# Patient Record
Sex: FEMALE | Race: OTHER | Employment: UNEMPLOYED | URBAN - METROPOLITAN AREA
[De-identification: names, ages, dates, MRNs, and addresses within clinical notes are randomized per-mention and may not be internally consistent; named-entity substitution may affect disease eponyms.]

---

## 2017-02-02 ENCOUNTER — HOSPITAL ENCOUNTER (EMERGENCY)
Facility: HOSPITAL | Age: 52
Discharge: HOME | End: 2017-02-03
Attending: EMERGENCY MEDICINE
Payer: MEDICAID

## 2017-02-02 DIAGNOSIS — B34.9 VIRAL SYNDROME: Primary | ICD-10-CM

## 2017-02-02 PROCEDURE — 96374 THER/PROPH/DIAG INJ IV PUSH: CPT

## 2017-02-02 PROCEDURE — 99284 EMERGENCY DEPT VISIT MOD MDM: CPT

## 2017-02-02 PROCEDURE — 96361 HYDRATE IV INFUSION ADD-ON: CPT

## 2017-02-03 ENCOUNTER — APPOINTMENT (OUTPATIENT)
Dept: GENERAL RADIOLOGY | Facility: HOSPITAL | Age: 52
End: 2017-02-03
Attending: EMERGENCY MEDICINE
Payer: MEDICAID

## 2017-02-03 VITALS
WEIGHT: 143 LBS | RESPIRATION RATE: 18 BRPM | TEMPERATURE: 98 F | HEIGHT: 65 IN | OXYGEN SATURATION: 99 % | DIASTOLIC BLOOD PRESSURE: 60 MMHG | SYSTOLIC BLOOD PRESSURE: 114 MMHG | HEART RATE: 65 BPM | BODY MASS INDEX: 23.82 KG/M2

## 2017-02-03 LAB
ALBUMIN SERPL-MCNC: 3.9 G/DL (ref 3.5–4.8)
ALP LIVER SERPL-CCNC: 78 U/L (ref 41–108)
ALT SERPL-CCNC: 25 U/L (ref 14–54)
AST SERPL-CCNC: 15 U/L (ref 15–41)
BASOPHILS # BLD AUTO: 0.05 X10(3) UL (ref 0–0.1)
BASOPHILS NFR BLD AUTO: 0.4 %
BILIRUB SERPL-MCNC: 0.2 MG/DL (ref 0.1–2)
BILIRUB UR QL STRIP.AUTO: NEGATIVE
BUN BLD-MCNC: 23 MG/DL (ref 8–20)
CALCIUM BLD-MCNC: 9.1 MG/DL (ref 8.3–10.3)
CHLORIDE: 106 MMOL/L (ref 101–111)
CLARITY UR REFRACT.AUTO: CLEAR
CO2: 25 MMOL/L (ref 22–32)
COLOR UR AUTO: YELLOW
CREAT BLD-MCNC: 0.98 MG/DL (ref 0.55–1.02)
EOSINOPHIL # BLD AUTO: 0.08 X10(3) UL (ref 0–0.3)
EOSINOPHIL NFR BLD AUTO: 0.7 %
ERYTHROCYTE [DISTWIDTH] IN BLOOD BY AUTOMATED COUNT: 14 % (ref 11.5–16)
GLUCOSE BLD-MCNC: 97 MG/DL (ref 70–99)
GLUCOSE UR STRIP.AUTO-MCNC: NEGATIVE MG/DL
HCT VFR BLD AUTO: 41.4 % (ref 34–50)
HGB BLD-MCNC: 13.2 G/DL (ref 12–16)
HYALINE CASTS #/AREA URNS AUTO: PRESENT /LPF
IMMATURE GRANULOCYTE COUNT: 0.06 X10(3) UL (ref 0–1)
IMMATURE GRANULOCYTE RATIO %: 0.5 %
KETONES UR STRIP.AUTO-MCNC: NEGATIVE MG/DL
LYMPHOCYTES # BLD AUTO: 4.24 X10(3) UL (ref 0.9–4)
LYMPHOCYTES NFR BLD AUTO: 36.9 %
M PROTEIN MFR SERPL ELPH: 8.3 G/DL (ref 6.1–8.3)
MCH RBC QN AUTO: 25.5 PG (ref 27–33.2)
MCHC RBC AUTO-ENTMCNC: 31.9 G/DL (ref 31–37)
MCV RBC AUTO: 79.9 FL (ref 81–100)
MONOCYTES # BLD AUTO: 0.54 X10(3) UL (ref 0.1–0.6)
MONOCYTES NFR BLD AUTO: 4.7 %
NEUTROPHIL ABS PRELIM: 6.52 X10 (3) UL (ref 1.3–6.7)
NEUTROPHILS # BLD AUTO: 6.52 X10(3) UL (ref 1.3–6.7)
NEUTROPHILS NFR BLD AUTO: 56.8 %
NITRITE UR QL STRIP.AUTO: NEGATIVE
PH UR STRIP.AUTO: 5 [PH] (ref 4.5–8)
PLATELET # BLD AUTO: 342 10(3)UL (ref 150–450)
POTASSIUM SERPL-SCNC: 3.6 MMOL/L (ref 3.6–5.1)
PROT UR STRIP.AUTO-MCNC: NEGATIVE MG/DL
RBC # BLD AUTO: 5.18 X10(6)UL (ref 3.8–5.1)
RBC UR QL AUTO: NEGATIVE
RED CELL DISTRIBUTION WIDTH-SD: 40.6 FL (ref 35.1–46.3)
SODIUM SERPL-SCNC: 141 MMOL/L (ref 136–144)
SP GR UR STRIP.AUTO: 1.02 (ref 1–1.03)
UROBILINOGEN UR STRIP.AUTO-MCNC: <2 MG/DL
WBC # BLD AUTO: 11.5 X10(3) UL (ref 4–13)

## 2017-02-03 PROCEDURE — 85025 COMPLETE CBC W/AUTO DIFF WBC: CPT | Performed by: EMERGENCY MEDICINE

## 2017-02-03 PROCEDURE — 80053 COMPREHEN METABOLIC PANEL: CPT | Performed by: EMERGENCY MEDICINE

## 2017-02-03 PROCEDURE — 71020 XR CHEST PA + LAT CHEST (CPT=71020): CPT

## 2017-02-03 PROCEDURE — 81001 URINALYSIS AUTO W/SCOPE: CPT | Performed by: EMERGENCY MEDICINE

## 2017-02-03 PROCEDURE — 87086 URINE CULTURE/COLONY COUNT: CPT | Performed by: EMERGENCY MEDICINE

## 2017-02-03 RX ORDER — CETIRIZINE HYDROCHLORIDE 10 MG/1
10 TABLET ORAL DAILY
Qty: 30 TABLET | Refills: 0 | Status: SHIPPED | OUTPATIENT
Start: 2017-02-03 | End: 2017-03-05

## 2017-02-03 RX ORDER — PROMETHAZINE HYDROCHLORIDE AND CODEINE PHOSPHATE 6.25; 1 MG/5ML; MG/5ML
5 SYRUP ORAL EVERY 4 HOURS PRN
Qty: 120 ML | Refills: 0 | Status: SHIPPED | OUTPATIENT
Start: 2017-02-03 | End: 2017-03-05

## 2017-02-03 RX ORDER — KETOROLAC TROMETHAMINE 30 MG/ML
30 INJECTION, SOLUTION INTRAMUSCULAR; INTRAVENOUS ONCE
Status: COMPLETED | OUTPATIENT
Start: 2017-02-03 | End: 2017-02-03

## 2017-02-03 NOTE — ED PROVIDER NOTES
Patient Seen in: [facility] Emergency Department    History   Patient presents with:  Cough/URI  Fatigue (constitutional, neurologic)    Stated Complaint: cough, headache, fatigue    HPI    Patient is a 44-year-old female smoker coming in for cough person, place, and time. She appears well-developed and well-nourished. No distress. HENT:   Head: Normocephalic and atraumatic. Mouth/Throat: Oropharynx is clear and moist. No oropharyngeal exudate.    Eyes: Conjunctivae and EOM are normal. Pupils are -----------         ------                     CBC W/ DIFFERENTIAL[[id number]]          Abnormal            Final result                 Please view results for these tests on the individual orders.    URINE CULTURE, ROUTINE   FRONTAL AND

## 2017-04-20 ENCOUNTER — APPOINTMENT (OUTPATIENT)
Dept: GENERAL RADIOLOGY | Facility: HOSPITAL | Age: 52
End: 2017-04-20
Payer: MEDICAID

## 2017-04-20 ENCOUNTER — HOSPITAL ENCOUNTER (EMERGENCY)
Facility: HOSPITAL | Age: 52
Discharge: HOME | End: 2017-04-20
Attending: EMERGENCY MEDICINE
Payer: MEDICAID

## 2017-04-20 VITALS
OXYGEN SATURATION: 98 % | HEART RATE: 79 BPM | BODY MASS INDEX: 24.99 KG/M2 | TEMPERATURE: 98 F | DIASTOLIC BLOOD PRESSURE: 53 MMHG | WEIGHT: 150 LBS | SYSTOLIC BLOOD PRESSURE: 118 MMHG | HEIGHT: 65 IN | RESPIRATION RATE: 16 BRPM

## 2017-04-20 DIAGNOSIS — M70.42 PREPATELLAR BURSITIS OF LEFT KNEE: Primary | ICD-10-CM

## 2017-04-20 DIAGNOSIS — M54.9 MID BACK PAIN: ICD-10-CM

## 2017-04-20 PROCEDURE — 99283 EMERGENCY DEPT VISIT LOW MDM: CPT

## 2017-04-20 PROCEDURE — 73562 X-RAY EXAM OF KNEE 3: CPT

## 2017-04-20 RX ORDER — IBUPROFEN 600 MG/1
600 TABLET ORAL EVERY 8 HOURS PRN
Qty: 30 TABLET | Refills: 0 | Status: SHIPPED | OUTPATIENT
Start: 2017-04-20 | End: 2017-04-30

## 2017-04-20 RX ORDER — METHYLPREDNISOLONE 4 MG/1
TABLET ORAL
Qty: 1 PACKAGE | Refills: 0 | Status: SHIPPED | OUTPATIENT
Start: 2017-04-20 | End: 2017-04-25

## 2017-04-21 NOTE — ED PROVIDER NOTES
Patient Seen in: [facility] Emergency Department    History   Patient presents with:  Lower Extremity Injury (musculoskeletal)  Back Pain (musculoskeletal)    Stated Complaint: knee/back pain    HPI    40-year-old female presents emergency departmen is alert and in no acute distress  HEENT: Pupils equal react to light extraocular muscles intact no scleral icterus, mucous membranes are moist, there is no erythema or exudate in the posterior pharynx  Neck: Supple no JVD no lymphadenopathy no meningismus with plan. Patient discharged in good condition.         Disposition and Plan     Clinical Impression:  Prepatellar bursitis of left knee  (primary encounter diagnosis)  Mid back pain    Disposition:  Discharge    Follow-up:  Richa Mari MD  [phone]

## 2017-04-21 NOTE — ED INITIAL ASSESSMENT (HPI)
Pt complains of pain in the left knee for the past 3 days. Pt states the pain started while walking up the stairs. Pt states the stairs and bending are the most amount of pain and will even hurt when straightening after bending.   Pt states the pain is in

## 2017-06-06 ENCOUNTER — HOSPITAL ENCOUNTER (OUTPATIENT)
Dept: GENERAL RADIOLOGY | Facility: HOSPITAL | Age: 52
Discharge: HOME | End: 2017-06-06
Attending: NURSE PRACTITIONER
Payer: MEDICAID

## 2017-06-06 ENCOUNTER — HOSPITAL ENCOUNTER (OUTPATIENT)
Dept: MRI IMAGING | Facility: HOSPITAL | Age: 52
Discharge: HOME | End: 2017-06-06
Payer: MEDICAID

## 2017-06-06 DIAGNOSIS — G89.29 CHRONIC PAIN OF LEFT KNEE: ICD-10-CM

## 2017-06-06 DIAGNOSIS — M54.30 SCIATIC PAIN: ICD-10-CM

## 2017-06-06 DIAGNOSIS — M25.562 CHRONIC PAIN OF LEFT KNEE: ICD-10-CM

## 2017-06-06 PROCEDURE — 72110 X-RAY EXAM L-2 SPINE 4/>VWS: CPT | Performed by: NURSE PRACTITIONER

## 2017-06-06 PROCEDURE — 73721 MRI JNT OF LWR EXTRE W/O DYE: CPT

## 2017-06-06 PROCEDURE — 73721 MRI JNT OF LWR EXTRE W/O DYE: CPT | Performed by: NURSE PRACTITIONER

## 2017-06-13 PROBLEM — S39.012A LUMBAR STRAIN, INITIAL ENCOUNTER: Status: ACTIVE | Noted: 2017-06-13

## 2017-06-13 PROBLEM — M79.605 PAIN OF LEFT LOWER EXTREMITY: Status: ACTIVE | Noted: 2017-06-13

## 2017-06-13 PROBLEM — M23.92 INTERNAL DERANGEMENT OF KNEE, LEFT: Status: ACTIVE | Noted: 2017-06-13

## 2017-07-08 ENCOUNTER — LAB ENCOUNTER (OUTPATIENT)
Dept: LAB | Facility: HOSPITAL | Age: 52
End: 2017-07-08
Attending: INTERNAL MEDICINE
Payer: MEDICAID

## 2017-07-08 ENCOUNTER — OFFICE VISIT (OUTPATIENT)
Dept: RHEUMATOLOGY | Facility: CLINIC | Age: 52
End: 2017-07-08

## 2017-07-08 VITALS
HEIGHT: 65 IN | SYSTOLIC BLOOD PRESSURE: 112 MMHG | DIASTOLIC BLOOD PRESSURE: 73 MMHG | HEART RATE: 84 BPM | BODY MASS INDEX: 24.69 KG/M2 | TEMPERATURE: 98 F | WEIGHT: 148.19 LBS

## 2017-07-08 DIAGNOSIS — L93.0 DISCOID LUPUS: Primary | ICD-10-CM

## 2017-07-08 DIAGNOSIS — M79.7 FIBROMYALGIA: ICD-10-CM

## 2017-07-08 DIAGNOSIS — R53.83 FATIGUE, UNSPECIFIED TYPE: ICD-10-CM

## 2017-07-08 DIAGNOSIS — L93.0 DISCOID LUPUS: ICD-10-CM

## 2017-07-08 LAB
ALBUMIN SERPL BCP-MCNC: 4.3 G/DL (ref 3.5–4.8)
ALBUMIN/GLOB SERPL: 1.1 {RATIO} (ref 1–2)
ALP SERPL-CCNC: 94 U/L (ref 32–100)
ALT SERPL-CCNC: 33 U/L (ref 14–54)
ANION GAP SERPL CALC-SCNC: 10 MMOL/L (ref 0–18)
AST SERPL-CCNC: 26 U/L (ref 15–41)
BILIRUB SERPL-MCNC: 0.4 MG/DL (ref 0.3–1.2)
BUN SERPL-MCNC: 9 MG/DL (ref 8–20)
BUN/CREAT SERPL: 17 (ref 10–20)
C3 SERPL-MCNC: 174 MG/DL (ref 88–201)
C4 SERPL-MCNC: 43 MG/DL (ref 18–55)
CALCIUM SERPL-MCNC: 9.8 MG/DL (ref 8.5–10.5)
CHLORIDE SERPL-SCNC: 101 MMOL/L (ref 95–110)
CK SERPL-CCNC: 31 U/L (ref 38–234)
CO2 SERPL-SCNC: 27 MMOL/L (ref 22–32)
CREAT SERPL-MCNC: 0.53 MG/DL (ref 0.5–1.5)
CRP SERPL-MCNC: <0.5 MG/DL (ref 0–0.9)
ERYTHROCYTE [DISTWIDTH] IN BLOOD BY AUTOMATED COUNT: 14.6 % (ref 11–15)
ERYTHROCYTE [SEDIMENTATION RATE] IN BLOOD: 9 MM/HR (ref 0–30)
GLOBULIN PLAS-MCNC: 3.8 G/DL (ref 2.5–3.7)
GLUCOSE SERPL-MCNC: 99 MG/DL (ref 70–99)
HCT VFR BLD AUTO: 43.4 % (ref 35–48)
HGB BLD-MCNC: 14.3 G/DL (ref 12–16)
MCH RBC QN AUTO: 26.1 PG (ref 27–32)
MCHC RBC AUTO-ENTMCNC: 32.9 G/DL (ref 32–37)
MCV RBC AUTO: 79.4 FL (ref 80–100)
OSMOLALITY UR CALC.SUM OF ELEC: 285 MOSM/KG (ref 275–295)
PLATELET # BLD AUTO: 326 K/UL (ref 140–400)
PMV BLD AUTO: 8.1 FL (ref 7.4–10.3)
POTASSIUM SERPL-SCNC: 4.3 MMOL/L (ref 3.3–5.1)
PROT SERPL-MCNC: 8.1 G/DL (ref 5.9–8.4)
RBC # BLD AUTO: 5.46 M/UL (ref 3.7–5.4)
RHEUMATOID FACT SER QL: <5 IU/ML
SODIUM SERPL-SCNC: 138 MMOL/L (ref 136–144)
TSH SERPL-ACNC: 3.88 UIU/ML (ref 0.45–5.33)
WBC # BLD AUTO: 7.4 K/UL (ref 4–11)

## 2017-07-08 PROCEDURE — 85652 RBC SED RATE AUTOMATED: CPT

## 2017-07-08 PROCEDURE — 86225 DNA ANTIBODY NATIVE: CPT

## 2017-07-08 PROCEDURE — 80053 COMPREHEN METABOLIC PANEL: CPT

## 2017-07-08 PROCEDURE — 36415 COLL VENOUS BLD VENIPUNCTURE: CPT

## 2017-07-08 PROCEDURE — 86235 NUCLEAR ANTIGEN ANTIBODY: CPT

## 2017-07-08 PROCEDURE — 86039 ANTINUCLEAR ANTIBODIES (ANA): CPT

## 2017-07-08 PROCEDURE — 99212 OFFICE O/P EST SF 10 MIN: CPT | Performed by: INTERNAL MEDICINE

## 2017-07-08 PROCEDURE — 85027 COMPLETE CBC AUTOMATED: CPT

## 2017-07-08 PROCEDURE — 84165 PROTEIN E-PHORESIS SERUM: CPT

## 2017-07-08 PROCEDURE — 86038 ANTINUCLEAR ANTIBODIES: CPT

## 2017-07-08 PROCEDURE — 86431 RHEUMATOID FACTOR QUANT: CPT

## 2017-07-08 PROCEDURE — 86160 COMPLEMENT ANTIGEN: CPT

## 2017-07-08 PROCEDURE — 86140 C-REACTIVE PROTEIN: CPT

## 2017-07-08 PROCEDURE — 84443 ASSAY THYROID STIM HORMONE: CPT

## 2017-07-08 PROCEDURE — 82550 ASSAY OF CK (CPK): CPT

## 2017-07-08 PROCEDURE — 99244 OFF/OP CNSLTJ NEW/EST MOD 40: CPT | Performed by: INTERNAL MEDICINE

## 2017-07-08 RX ORDER — HYDROXYCHLOROQUINE SULFATE 200 MG/1
TABLET, FILM COATED ORAL
Qty: 180 TABLET | Refills: 3 | Status: SHIPPED | OUTPATIENT
Start: 2017-07-08 | End: 2018-05-11

## 2017-07-08 NOTE — PROGRESS NOTES
Dear Rocael Narayanan NP:    I saw your patient Malvin Tejeda in consultation this morning at your request for evaluation of lupus. As you know, she is a 49-year-old woman who developed a skin lesion on her left cheek in 1990.   It was biopsied and consistent twice a day which decreases some of her pain. No known drug allergies. Family history:  Her sister  from lupus which was severe in her kidneys. An aunt had \"bone lupus\". Social history:  She is single, and lives with a friend.   She is not wor Neck moves well. Shoulders move well. Elbows, wrists, and hands are unremarkable without synovitis. Capillary refill is good in her fingertips. Lumbar spine flexion is mildly limited. Hips move okay with some discomfort.   Small effusion of her left kn

## 2017-07-11 LAB
ALBUMIN SERPL ELPH-MCNC: 4.95 G/DL (ref 3.8–5.8)
ALBUMIN/GLOB SERPL: 1.53 {RATIO} (ref 1–2)
ALPHA1 GLOB SERPL ELPH-MCNC: 0.19 G/DL (ref 0.1–0.3)
ALPHA2 GLOB SERPL ELPH-MCNC: 0.85 G/DL (ref 0.6–1)
B-GLOBULIN SERPL ELPH-MCNC: 1.13 G/DL (ref 0.7–1.3)
ENA SM IGG SER QL: NEGATIVE
ENA SM+RNP AB SER QL: NEGATIVE
ENA SS-A AB SER QL IA: NEGATIVE
ENA SS-B AB SER QL IA: NEGATIVE
GAMMA GLOB SERPL ELPH-MCNC: 1.07 G/DL (ref 0.5–1.7)
TOTAL PROTEIN (SPECIAL TESTING): 8.2 G/DL (ref 6.5–9.1)

## 2017-07-12 LAB — DSDNA AB TITR SER: <10 {TITER}

## 2017-07-13 LAB — ANA NUCLEOLAR TITR SER IF: 160 {TITER}

## 2017-07-20 ENCOUNTER — APPOINTMENT (OUTPATIENT)
Dept: PHYSICAL THERAPY | Facility: HOSPITAL | Age: 52
End: 2017-07-20
Attending: PHYSICAL MEDICINE & REHABILITATION
Payer: MEDICAID

## 2017-07-24 ENCOUNTER — OFFICE VISIT (OUTPATIENT)
Dept: RHEUMATOLOGY | Facility: CLINIC | Age: 52
End: 2017-07-24

## 2017-07-24 VITALS
SYSTOLIC BLOOD PRESSURE: 115 MMHG | WEIGHT: 148.81 LBS | HEART RATE: 73 BPM | HEIGHT: 65 IN | DIASTOLIC BLOOD PRESSURE: 74 MMHG | BODY MASS INDEX: 24.79 KG/M2

## 2017-07-24 DIAGNOSIS — M79.7 FIBROMYALGIA: ICD-10-CM

## 2017-07-24 DIAGNOSIS — L93.0 DISCOID LUPUS: Primary | ICD-10-CM

## 2017-07-24 PROCEDURE — 99212 OFFICE O/P EST SF 10 MIN: CPT | Performed by: INTERNAL MEDICINE

## 2017-07-24 PROCEDURE — 99213 OFFICE O/P EST LOW 20 MIN: CPT | Performed by: INTERNAL MEDICINE

## 2017-07-24 RX ORDER — CYCLOBENZAPRINE HCL 5 MG
TABLET ORAL
Qty: 60 TABLET | Refills: 5 | Status: SHIPPED | OUTPATIENT
Start: 2017-07-24 | End: 2017-08-28

## 2017-07-24 NOTE — PROGRESS NOTES
Dear Lucinda March:    I saw Cal De La Rosa in follow-up this afternoon in my rheumatology clinic. Please see my consult from July 8th. 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     Her back doctor started her on

## 2017-07-26 ENCOUNTER — OFFICE VISIT (OUTPATIENT)
Dept: PHYSICAL THERAPY | Facility: HOSPITAL | Age: 52
End: 2017-07-26
Attending: PHYSICAL MEDICINE & REHABILITATION
Payer: MEDICAID

## 2017-07-26 DIAGNOSIS — M54.16 LUMBAR RADICULAR PAIN: ICD-10-CM

## 2017-07-26 DIAGNOSIS — M47.816 FACET ARTHROPATHY, LUMBAR: ICD-10-CM

## 2017-07-26 DIAGNOSIS — M51.36 DDD (DEGENERATIVE DISC DISEASE), LUMBAR: ICD-10-CM

## 2017-07-26 DIAGNOSIS — L93.0 DISCOID LUPUS: ICD-10-CM

## 2017-07-26 PROCEDURE — 97163 PT EVAL HIGH COMPLEX 45 MIN: CPT

## 2017-07-26 PROCEDURE — 97110 THERAPEUTIC EXERCISES: CPT

## 2017-07-26 NOTE — PROGRESS NOTES
LUMBAR SPINE EVALUATION:   Referring Physician: Dr. William Holloway  Date of Onset: Flare up May, 2017.  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 Pt reports addition of R LE pain in the last several days. 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   Pt self reports she was previously able to walk 2 blocks before th to reported  pain   EHL (L5) 4 4    Ankle PF (S1) 2+ 3+  self limits L LE  due to reported  pain   Hip Abduction 2+ 3-  self limits B LE due to reported pain   Hip Ext 3- 4-  self limits L LE  due to reported  pain   Hip ER  3- 4-  self limits L LE  due to determined within 4-5 visits and appropriate ex issued. L Knee strength will improve to 3+/5 or more to promote ADL of walking stairs, bending, and squatting.    L hip ER and Abd strength will improve to 3+/5 to promote ADL of stepping, standing, and stair

## 2017-07-28 ENCOUNTER — APPOINTMENT (OUTPATIENT)
Dept: PHYSICAL THERAPY | Facility: HOSPITAL | Age: 52
End: 2017-07-28
Attending: PHYSICAL MEDICINE & REHABILITATION
Payer: MEDICAID

## 2017-08-02 ENCOUNTER — APPOINTMENT (OUTPATIENT)
Dept: PHYSICAL THERAPY | Facility: HOSPITAL | Age: 52
End: 2017-08-02
Attending: PHYSICAL MEDICINE & REHABILITATION
Payer: MEDICAID

## 2017-08-04 ENCOUNTER — OFFICE VISIT (OUTPATIENT)
Dept: PHYSICAL THERAPY | Facility: HOSPITAL | Age: 52
End: 2017-08-04
Attending: PHYSICAL MEDICINE & REHABILITATION
Payer: MEDICAID

## 2017-08-04 DIAGNOSIS — M54.16 LUMBAR RADICULAR PAIN: ICD-10-CM

## 2017-08-04 DIAGNOSIS — L93.0 DISCOID LUPUS: ICD-10-CM

## 2017-08-04 DIAGNOSIS — M47.816 FACET ARTHROPATHY, LUMBAR: ICD-10-CM

## 2017-08-04 DIAGNOSIS — M51.36 DDD (DEGENERATIVE DISC DISEASE), LUMBAR: ICD-10-CM

## 2017-08-04 PROCEDURE — 97110 THERAPEUTIC EXERCISES: CPT

## 2017-08-04 NOTE — PROGRESS NOTES
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

## 2017-08-07 ENCOUNTER — OFFICE VISIT (OUTPATIENT)
Dept: PHYSICAL THERAPY | Facility: HOSPITAL | Age: 52
End: 2017-08-07
Attending: PHYSICAL MEDICINE & REHABILITATION
Payer: MEDICAID

## 2017-08-07 DIAGNOSIS — M54.16 LUMBAR RADICULAR PAIN: ICD-10-CM

## 2017-08-07 DIAGNOSIS — M51.36 DDD (DEGENERATIVE DISC DISEASE), LUMBAR: ICD-10-CM

## 2017-08-07 DIAGNOSIS — L93.0 DISCOID LUPUS: ICD-10-CM

## 2017-08-07 DIAGNOSIS — M47.816 FACET ARTHROPATHY, LUMBAR: ICD-10-CM

## 2017-08-07 PROCEDURE — 97110 THERAPEUTIC EXERCISES: CPT

## 2017-08-07 NOTE — PROGRESS NOTES
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

## 2017-08-11 ENCOUNTER — APPOINTMENT (OUTPATIENT)
Dept: PHYSICAL THERAPY | Facility: HOSPITAL | Age: 52
End: 2017-08-11
Attending: PHYSICAL MEDICINE & REHABILITATION
Payer: MEDICAID

## 2017-08-14 ENCOUNTER — APPOINTMENT (OUTPATIENT)
Dept: PHYSICAL THERAPY | Facility: HOSPITAL | Age: 52
End: 2017-08-14
Attending: PHYSICAL MEDICINE & REHABILITATION
Payer: MEDICAID

## 2017-08-18 ENCOUNTER — APPOINTMENT (OUTPATIENT)
Dept: PHYSICAL THERAPY | Facility: HOSPITAL | Age: 52
End: 2017-08-18
Attending: PHYSICAL MEDICINE & REHABILITATION
Payer: MEDICAID

## 2017-08-21 ENCOUNTER — APPOINTMENT (OUTPATIENT)
Dept: PHYSICAL THERAPY | Facility: HOSPITAL | Age: 52
End: 2017-08-21
Attending: PHYSICAL MEDICINE & REHABILITATION
Payer: MEDICAID

## 2017-08-24 ENCOUNTER — HOSPITAL ENCOUNTER (OUTPATIENT)
Dept: GENERAL RADIOLOGY | Facility: HOSPITAL | Age: 52
Discharge: HOME | End: 2017-08-24
Attending: NURSE PRACTITIONER
Payer: MEDICAID

## 2017-08-24 ENCOUNTER — HOSPITAL ENCOUNTER (OUTPATIENT)
Dept: MRI IMAGING | Facility: HOSPITAL | Age: 52
Discharge: HOME | End: 2017-08-24
Attending: PHYSICAL MEDICINE & REHABILITATION
Payer: MEDICAID

## 2017-08-24 DIAGNOSIS — M54.12 CERVICAL RADICULAR PAIN: ICD-10-CM

## 2017-08-24 DIAGNOSIS — M51.36 DDD (DEGENERATIVE DISC DISEASE), LUMBAR: ICD-10-CM

## 2017-08-24 DIAGNOSIS — M54.16 LUMBAR RADICULAR PAIN: ICD-10-CM

## 2017-08-24 DIAGNOSIS — L93.0 DISCOID LUPUS: ICD-10-CM

## 2017-08-24 DIAGNOSIS — R07.81 RIB PAIN: ICD-10-CM

## 2017-08-24 PROCEDURE — 72148 MRI LUMBAR SPINE W/O DYE: CPT | Performed by: PHYSICAL MEDICINE & REHABILITATION

## 2017-08-24 PROCEDURE — 71020 XR CHEST PA + LAT CHEST (CPT=71020): CPT | Performed by: NURSE PRACTITIONER

## 2017-08-24 PROCEDURE — 72141 MRI NECK SPINE W/O DYE: CPT | Performed by: PHYSICAL MEDICINE & REHABILITATION

## 2017-08-24 NOTE — PROGRESS NOTES
Marly-- can you f/u with her? IF she is still totally numb in the left leg and getting worse on the right she should see surgery    Heather,    There is significant narrowing in the spine at the lowest lumbar level due to a combination of factors.  Are you st

## 2017-08-24 NOTE — PROGRESS NOTES
Marly  Can you call her? This needs to f/u on asap with the parotid. Daryn Wolfe is also wear and tear in the neck causing some narrowing. But the MRI also picked up a change in your parotid gland that is somewhat concerning.  I would like you to DR RICK LE [facility]

## 2017-08-25 ENCOUNTER — APPOINTMENT (OUTPATIENT)
Dept: PHYSICAL THERAPY | Facility: HOSPITAL | Age: 52
End: 2017-08-25
Attending: PHYSICAL MEDICINE & REHABILITATION
Payer: MEDICAID

## 2017-08-28 ENCOUNTER — OFFICE VISIT (OUTPATIENT)
Dept: RHEUMATOLOGY | Facility: CLINIC | Age: 52
End: 2017-08-28

## 2017-08-28 VITALS
SYSTOLIC BLOOD PRESSURE: 105 MMHG | WEIGHT: 146.5 LBS | HEART RATE: 78 BPM | HEIGHT: 65 IN | BODY MASS INDEX: 24.41 KG/M2 | DIASTOLIC BLOOD PRESSURE: 69 MMHG

## 2017-08-28 DIAGNOSIS — M47.812 OSTEOARTHRITIS OF CERVICAL SPINE, UNSPECIFIED SPINAL OSTEOARTHRITIS COMPLICATION STATUS: ICD-10-CM

## 2017-08-28 DIAGNOSIS — L93.0 DISCOID LUPUS: Primary | ICD-10-CM

## 2017-08-28 DIAGNOSIS — M48.061 LUMBAR STENOSIS: ICD-10-CM

## 2017-08-28 DIAGNOSIS — M79.7 FIBROMYALGIA: ICD-10-CM

## 2017-08-28 PROCEDURE — 99213 OFFICE O/P EST LOW 20 MIN: CPT | Performed by: INTERNAL MEDICINE

## 2017-08-28 PROCEDURE — 99212 OFFICE O/P EST SF 10 MIN: CPT | Performed by: INTERNAL MEDICINE

## 2017-08-28 RX ORDER — CYCLOBENZAPRINE HCL 5 MG
TABLET ORAL
Qty: 180 TABLET | Refills: 3 | Status: SHIPPED | OUTPATIENT
Start: 2017-08-28

## 2017-08-28 RX ORDER — ACETAMINOPHEN AND CODEINE PHOSPHATE 300; 30 MG/1; MG/1
TABLET ORAL
Refills: 0 | COMMUNITY
Start: 2017-08-15 | End: 2017-09-19

## 2017-08-28 NOTE — PROGRESS NOTES
HPI:    Patient ID: Malvin Tejeda is a 46year old female. Malvin Tejeda is a 80-year-old woman who I last saw July 24, 2017, with discoid lupus and diffuse myofascial pain syndrome. She has been on hydroxychloroquine 400 mg daily.   The active lesion on he Effort normal and breath sounds normal.   Abdominal: Soft. Bowel sounds are normal. She exhibits no mass. There is no tenderness. There is no rebound and no guarding. Musculoskeletal: She exhibits no edema.    There is tenderness to palpation from her Mayra Lace

## 2017-08-29 NOTE — PROGRESS NOTES
Patient attended 2 visits then cancelled remaining visits due to MD wanting her to have MRI before continuing therapy. Patient will be discharged at this time as PT authorization  on 17.  Will need new order and new authorization to continue PT

## 2017-09-16 ENCOUNTER — HOSPITAL ENCOUNTER (OUTPATIENT)
Dept: MAMMOGRAPHY | Facility: HOSPITAL | Age: 52
Discharge: HOME | End: 2017-09-16
Attending: NURSE PRACTITIONER
Payer: MEDICAID

## 2017-09-16 DIAGNOSIS — Z12.31 ENCOUNTER FOR SCREENING MAMMOGRAM FOR MALIGNANT NEOPLASM OF BREAST: ICD-10-CM

## 2017-09-16 PROCEDURE — 77067 SCR MAMMO BI INCL CAD: CPT | Performed by: NURSE PRACTITIONER

## 2017-09-26 ENCOUNTER — LAB ENCOUNTER (OUTPATIENT)
Dept: LAB | Age: 52
End: 2017-09-26
Attending: OTOLARYNGOLOGY
Payer: MEDICAID

## 2017-09-26 DIAGNOSIS — K11.8 PAROTID MASS: Primary | ICD-10-CM

## 2017-09-26 PROCEDURE — 88173 CYTOPATH EVAL FNA REPORT: CPT | Performed by: OTOLARYNGOLOGY

## 2017-09-26 PROCEDURE — 88305 TISSUE EXAM BY PATHOLOGIST: CPT | Performed by: OTOLARYNGOLOGY

## 2017-09-28 PROBLEM — R53.83 FATIGUE, UNSPECIFIED TYPE: Status: ACTIVE | Noted: 2017-09-28

## 2017-09-28 PROBLEM — R06.83 SNORING: Status: ACTIVE | Noted: 2017-09-28

## 2017-09-28 PROBLEM — Z72.0 TOBACCO ABUSE: Status: ACTIVE | Noted: 2017-09-28

## 2017-09-28 PROBLEM — R94.2 ABNORMAL PFT: Status: ACTIVE | Noted: 2017-09-28

## 2017-10-24 PROBLEM — M48.062 LUMBAR STENOSIS WITH NEUROGENIC CLAUDICATION: Status: ACTIVE | Noted: 2017-10-24

## 2017-10-24 PROBLEM — M54.12 CERVICAL SPONDYLITIS WITH RADICULITIS: Status: ACTIVE | Noted: 2017-10-24

## 2017-10-24 PROBLEM — M54.12 CERVICAL RADICULITIS: Status: ACTIVE | Noted: 2017-10-24

## 2017-10-24 PROBLEM — M54.12 CERVICAL SPONDYLITIS WITH RADICULITIS (HCC): Status: ACTIVE | Noted: 2017-10-24

## 2017-10-24 PROBLEM — M46.92 CERVICAL SPONDYLITIS WITH RADICULITIS (HCC): Status: ACTIVE | Noted: 2017-10-24

## 2017-10-24 PROBLEM — M46.92 CERVICAL SPONDYLITIS WITH RADICULITIS: Status: ACTIVE | Noted: 2017-10-24

## 2017-10-24 PROBLEM — M48.02 SPINAL STENOSIS IN CERVICAL REGION: Status: ACTIVE | Noted: 2017-10-24

## 2017-11-06 ENCOUNTER — HOSPITAL ENCOUNTER (OUTPATIENT)
Dept: MAMMOGRAPHY | Facility: HOSPITAL | Age: 52
Discharge: HOME | End: 2017-11-06
Attending: NURSE PRACTITIONER
Payer: MEDICAID

## 2017-11-06 ENCOUNTER — HOSPITAL ENCOUNTER (OUTPATIENT)
Dept: ULTRASOUND IMAGING | Facility: HOSPITAL | Age: 52
End: 2017-11-06
Attending: NURSE PRACTITIONER
Payer: MEDICAID

## 2017-11-06 DIAGNOSIS — R92.8 ABNORMAL MAMMOGRAM: ICD-10-CM

## 2017-11-06 PROCEDURE — 76642 ULTRASOUND BREAST LIMITED: CPT | Performed by: NURSE PRACTITIONER

## 2017-11-06 PROCEDURE — 77065 DX MAMMO INCL CAD UNI: CPT | Performed by: NURSE PRACTITIONER

## 2017-11-08 ENCOUNTER — HOSPITAL ENCOUNTER (OUTPATIENT)
Dept: CT IMAGING | Facility: HOSPITAL | Age: 52
Discharge: HOME | End: 2017-11-08
Attending: NURSE PRACTITIONER
Payer: MEDICAID

## 2017-11-08 ENCOUNTER — HOSPITAL ENCOUNTER (OUTPATIENT)
Dept: MRI IMAGING | Facility: HOSPITAL | Age: 52
End: 2017-11-08
Attending: NURSE PRACTITIONER
Payer: MEDICAID

## 2017-11-08 DIAGNOSIS — R07.81 RIB PAIN: ICD-10-CM

## 2017-11-08 PROCEDURE — 71250 CT THORAX DX C-: CPT | Performed by: NURSE PRACTITIONER

## 2017-11-21 NOTE — PLAN OF CARE
RN called the office and verified the phone numbers that are on file for pt. The office has the same ones as we have here. RN will keep trying.

## 2017-11-27 RX ORDER — ALBUTEROL SULFATE 90 UG/1
AEROSOL, METERED RESPIRATORY (INHALATION) AS NEEDED
COMMUNITY

## 2017-12-06 ENCOUNTER — HOSPITAL ENCOUNTER (OUTPATIENT)
Facility: HOSPITAL | Age: 52
Discharge: HOME | End: 2017-12-07
Attending: OTOLARYNGOLOGY | Admitting: OTOLARYNGOLOGY
Payer: MEDICAID

## 2017-12-06 ENCOUNTER — SURGERY (OUTPATIENT)
Age: 52
End: 2017-12-06

## 2017-12-06 DIAGNOSIS — D37.030 NEOPLASM OF UNCERTAIN BEHAVIOR OF PAROTID GLAND: ICD-10-CM

## 2017-12-06 PROCEDURE — 88341 IMHCHEM/IMCYTCHM EA ADD ANTB: CPT | Performed by: OTOLARYNGOLOGY

## 2017-12-06 PROCEDURE — 88305 TISSUE EXAM BY PATHOLOGIST: CPT | Performed by: OTOLARYNGOLOGY

## 2017-12-06 PROCEDURE — 88342 IMHCHEM/IMCYTCHM 1ST ANTB: CPT | Performed by: OTOLARYNGOLOGY

## 2017-12-06 PROCEDURE — 0CB80ZZ EXCISION OF RIGHT PAROTID GLAND, OPEN APPROACH: ICD-10-PCS | Performed by: OTOLARYNGOLOGY

## 2017-12-06 PROCEDURE — 88307 TISSUE EXAM BY PATHOLOGIST: CPT | Performed by: OTOLARYNGOLOGY

## 2017-12-06 RX ORDER — PANTOPRAZOLE SODIUM 20 MG/1
20 TABLET, DELAYED RELEASE ORAL
Status: DISCONTINUED | OUTPATIENT
Start: 2017-12-06 | End: 2017-12-07

## 2017-12-06 RX ORDER — DEXTROSE, SODIUM CHLORIDE, AND POTASSIUM CHLORIDE 5; .45; .15 G/100ML; G/100ML; G/100ML
INJECTION INTRAVENOUS CONTINUOUS
Status: DISCONTINUED | OUTPATIENT
Start: 2017-12-06 | End: 2017-12-07

## 2017-12-06 RX ORDER — GABAPENTIN 100 MG/1
100 CAPSULE ORAL 3 TIMES DAILY
Status: DISCONTINUED | OUTPATIENT
Start: 2017-12-06 | End: 2017-12-07

## 2017-12-06 RX ORDER — HYDROCODONE BITARTRATE AND ACETAMINOPHEN 5; 325 MG/1; MG/1
2 TABLET ORAL AS NEEDED
Status: DISCONTINUED | OUTPATIENT
Start: 2017-12-06 | End: 2017-12-06 | Stop reason: HOSPADM

## 2017-12-06 RX ORDER — DOCUSATE SODIUM 100 MG/1
100 CAPSULE, LIQUID FILLED ORAL 2 TIMES DAILY
Qty: 30 CAPSULE | Refills: 0 | Status: SHIPPED | OUTPATIENT
Start: 2017-12-06 | End: 2017-12-21

## 2017-12-06 RX ORDER — MORPHINE SULFATE 4 MG/ML
2 INJECTION, SOLUTION INTRAMUSCULAR; INTRAVENOUS EVERY 2 HOUR PRN
Status: DISCONTINUED | OUTPATIENT
Start: 2017-12-06 | End: 2017-12-07

## 2017-12-06 RX ORDER — ALBUTEROL SULFATE 90 UG/1
1 AEROSOL, METERED RESPIRATORY (INHALATION) EVERY 6 HOURS PRN
Status: DISCONTINUED | OUTPATIENT
Start: 2017-12-06 | End: 2017-12-07

## 2017-12-06 RX ORDER — SODIUM CHLORIDE, SODIUM LACTATE, POTASSIUM CHLORIDE, CALCIUM CHLORIDE 600; 310; 30; 20 MG/100ML; MG/100ML; MG/100ML; MG/100ML
INJECTION, SOLUTION INTRAVENOUS CONTINUOUS
Status: DISCONTINUED | OUTPATIENT
Start: 2017-12-06 | End: 2017-12-06

## 2017-12-06 RX ORDER — BISACODYL 10 MG
10 SUPPOSITORY, RECTAL RECTAL
Status: DISCONTINUED | OUTPATIENT
Start: 2017-12-06 | End: 2017-12-07

## 2017-12-06 RX ORDER — HYDROCODONE BITARTRATE AND ACETAMINOPHEN 5; 325 MG/1; MG/1
2 TABLET ORAL EVERY 4 HOURS PRN
Status: DISCONTINUED | OUTPATIENT
Start: 2017-12-06 | End: 2017-12-07

## 2017-12-06 RX ORDER — HEPARIN SODIUM 5000 [USP'U]/ML
5000 INJECTION, SOLUTION INTRAVENOUS; SUBCUTANEOUS EVERY 8 HOURS SCHEDULED
Status: DISCONTINUED | OUTPATIENT
Start: 2017-12-06 | End: 2017-12-07

## 2017-12-06 RX ORDER — HYDROXYCHLOROQUINE SULFATE 200 MG/1
200 TABLET, FILM COATED ORAL 2 TIMES DAILY
Status: DISCONTINUED | OUTPATIENT
Start: 2017-12-06 | End: 2017-12-07

## 2017-12-06 RX ORDER — SODIUM CHLORIDE, SODIUM LACTATE, POTASSIUM CHLORIDE, CALCIUM CHLORIDE 600; 310; 30; 20 MG/100ML; MG/100ML; MG/100ML; MG/100ML
INJECTION, SOLUTION INTRAVENOUS CONTINUOUS
Status: DISCONTINUED | OUTPATIENT
Start: 2017-12-06 | End: 2017-12-06 | Stop reason: HOSPADM

## 2017-12-06 RX ORDER — CEFAZOLIN SODIUM 1 G/3ML
INJECTION, POWDER, FOR SOLUTION INTRAMUSCULAR; INTRAVENOUS
Status: DISCONTINUED | OUTPATIENT
Start: 2017-12-06 | End: 2017-12-06 | Stop reason: HOSPADM

## 2017-12-06 RX ORDER — NALOXONE HYDROCHLORIDE 0.4 MG/ML
80 INJECTION, SOLUTION INTRAMUSCULAR; INTRAVENOUS; SUBCUTANEOUS AS NEEDED
Status: DISCONTINUED | OUTPATIENT
Start: 2017-12-06 | End: 2017-12-06 | Stop reason: HOSPADM

## 2017-12-06 RX ORDER — MORPHINE SULFATE 4 MG/ML
8 INJECTION, SOLUTION INTRAMUSCULAR; INTRAVENOUS EVERY 2 HOUR PRN
Status: DISCONTINUED | OUTPATIENT
Start: 2017-12-06 | End: 2017-12-07

## 2017-12-06 RX ORDER — PANTOPRAZOLE SODIUM 20 MG/1
20 TABLET, DELAYED RELEASE ORAL
Status: DISCONTINUED | OUTPATIENT
Start: 2017-12-07 | End: 2017-12-06

## 2017-12-06 RX ORDER — LIDOCAINE HYDROCHLORIDE AND EPINEPHRINE 10; 10 MG/ML; UG/ML
INJECTION, SOLUTION INFILTRATION; PERINEURAL AS NEEDED
Status: DISCONTINUED | OUTPATIENT
Start: 2017-12-06 | End: 2017-12-06 | Stop reason: HOSPADM

## 2017-12-06 RX ORDER — CYCLOBENZAPRINE HCL 5 MG
TABLET ORAL NIGHTLY PRN
Status: DISCONTINUED | OUTPATIENT
Start: 2017-12-06 | End: 2017-12-07

## 2017-12-06 RX ORDER — HYDROCODONE BITARTRATE AND ACETAMINOPHEN 5; 325 MG/1; MG/1
1 TABLET ORAL AS NEEDED
Status: DISCONTINUED | OUTPATIENT
Start: 2017-12-06 | End: 2017-12-06 | Stop reason: HOSPADM

## 2017-12-06 RX ORDER — ACETAMINOPHEN 325 MG/1
650 TABLET ORAL EVERY 4 HOURS PRN
Status: DISCONTINUED | OUTPATIENT
Start: 2017-12-06 | End: 2017-12-07

## 2017-12-06 RX ORDER — POLYETHYLENE GLYCOL 3350 17 G/17G
17 POWDER, FOR SOLUTION ORAL DAILY PRN
Status: DISCONTINUED | OUTPATIENT
Start: 2017-12-06 | End: 2017-12-07

## 2017-12-06 RX ORDER — DOCUSATE SODIUM 100 MG/1
100 CAPSULE, LIQUID FILLED ORAL 2 TIMES DAILY
Status: DISCONTINUED | OUTPATIENT
Start: 2017-12-06 | End: 2017-12-07

## 2017-12-06 RX ORDER — MORPHINE SULFATE 4 MG/ML
4 INJECTION, SOLUTION INTRAMUSCULAR; INTRAVENOUS EVERY 2 HOUR PRN
Status: DISCONTINUED | OUTPATIENT
Start: 2017-12-06 | End: 2017-12-07

## 2017-12-06 RX ORDER — HYDROCODONE BITARTRATE AND ACETAMINOPHEN 5; 325 MG/1; MG/1
1 TABLET ORAL EVERY 4 HOURS PRN
Status: DISCONTINUED | OUTPATIENT
Start: 2017-12-06 | End: 2017-12-07

## 2017-12-06 RX ORDER — SODIUM PHOSPHATE, DIBASIC AND SODIUM PHOSPHATE, MONOBASIC 7; 19 G/133ML; G/133ML
1 ENEMA RECTAL ONCE AS NEEDED
Status: DISCONTINUED | OUTPATIENT
Start: 2017-12-06 | End: 2017-12-07

## 2017-12-06 RX ORDER — SCOLOPAMINE TRANSDERMAL SYSTEM 1 MG/1
1 PATCH, EXTENDED RELEASE TRANSDERMAL
Qty: 3 PATCH | Refills: 0 | Status: SHIPPED | OUTPATIENT
Start: 2017-12-06 | End: 2018-01-05

## 2017-12-06 RX ORDER — DEXTROSE, SODIUM CHLORIDE, AND POTASSIUM CHLORIDE 5; .45; .15 G/100ML; G/100ML; G/100ML
INJECTION INTRAVENOUS
Status: COMPLETED
Start: 2017-12-06 | End: 2017-12-06

## 2017-12-06 RX ORDER — ONDANSETRON 2 MG/ML
4 INJECTION INTRAMUSCULAR; INTRAVENOUS EVERY 6 HOURS PRN
Status: DISCONTINUED | OUTPATIENT
Start: 2017-12-06 | End: 2017-12-07

## 2017-12-06 RX ORDER — HYDROCODONE BITARTRATE AND ACETAMINOPHEN 5; 325 MG/1; MG/1
1 TABLET ORAL EVERY 4 HOURS PRN
Qty: 30 TABLET | Refills: 0 | Status: SHIPPED | OUTPATIENT
Start: 2017-12-06 | End: 2017-12-16

## 2017-12-06 RX ORDER — ACETAMINOPHEN 500 MG
1000 TABLET ORAL ONCE AS NEEDED
Status: DISCONTINUED | OUTPATIENT
Start: 2017-12-06 | End: 2017-12-06 | Stop reason: HOSPADM

## 2017-12-06 RX ORDER — ONDANSETRON 2 MG/ML
4 INJECTION INTRAMUSCULAR; INTRAVENOUS AS NEEDED
Status: DISCONTINUED | OUTPATIENT
Start: 2017-12-06 | End: 2017-12-06 | Stop reason: HOSPADM

## 2017-12-06 NOTE — H&P
The history and physical have been reviewed by Dr Wilver Louis and there are no interval changes. Okay to proceed with surgery.

## 2017-12-06 NOTE — OPERATIVE REPORT
PATIENT NAME: Malvin Tejeda   MRN: [id number]   DATE OF OPERATION: 12/6/2017     PREOPERATIVE DIAGNOSIS:    1. right parotid mass  POSTOPERATIVE DIAGNOSIS:   1. right parotid mass  PROCEDURE:    1. right superficial parotidectomy   2.  Nerve monitoring   3. Reg extending inferiorly to the angle of mandible. Hemostasis was acheived with electroacutery. A skin flap was raised with metzenbaum scissors, making pockets in the SMAS and then connecting these pockets. The flap was secured with lone star hooks.  The lela fashion. Patient was then turned over to the anesthesiologist for wake-up. Patient woke without complications. The patient was extubated and transferred from the OR table to the stretcher and from the stretcher to the PACU in stable condition.     Holden Bell [facility] 7964

## 2017-12-07 VITALS
OXYGEN SATURATION: 97 % | DIASTOLIC BLOOD PRESSURE: 55 MMHG | BODY MASS INDEX: 26.66 KG/M2 | TEMPERATURE: 99 F | HEIGHT: 62.5 IN | RESPIRATION RATE: 18 BRPM | HEART RATE: 73 BPM | SYSTOLIC BLOOD PRESSURE: 99 MMHG | WEIGHT: 148.56 LBS

## 2017-12-07 RX ORDER — CEFAZOLIN SODIUM/WATER 2 G/20 ML
2 SYRINGE (ML) INTRAVENOUS EVERY 8 HOURS
Status: DISCONTINUED | OUTPATIENT
Start: 2017-12-07 | End: 2017-12-07

## 2017-12-07 NOTE — DISCHARGE SUMMARY
[facility]  Discharge Summary    Wendy Smith Patient Status:  Outpatient in a Bed    1965 MRN [id number]   [facility] 3NW-A Attending Rizwana Russell MD   Hosp Day # 0 TERESA Ontiveros NP     Date of Admission:  2017 20 MG Cpdr  Commonly known as:  PRILOSEC      Take 20 mg by mouth every morning before breakfast.   Refills:  0        STOP taking these medications    naproxen 500 MG Tabs  Commonly known as:  NAPROSYN              Where to Get Your Medications      [facility]

## 2017-12-07 NOTE — PROGRESS NOTES
Patient resting in bed. Alert and orientated. VSS. Denies any pain or discomfort at this time. Lung sounds are clear bilaterally. Abdomen is soft, non-distended. BS present. Denies passing gas at this time. Denies nausea/vomiting, tolerating regular diet.

## 2017-12-07 NOTE — PAYOR COMM NOTE
--------------  ADMISSION REVIEW     Payor: [address] [place] [address]  Subscriber #:  [id number]  Authorization Number: N/A    Admit date: N/A  Admit time: N/A       Admitting Physician: Lorie Nobles MD  Attending Physician:  Shaheen Alves Hui Hahn, RN    12/7/2017 0100 Rate/Dose Verify (none) Intravenous Sherman Bojorquez RN    12/6/2017 1715 New Bag (none) Intravenous Gabrielle Lew RN      lidocaine-EPINEPHrine 1 %-1:469120 injection     Date Action Dose Route User    12/6/2017 1356 G

## 2017-12-07 NOTE — PROGRESS NOTES
Vss. Pt resting in bed at this time. Pt on ra with 02 sats wnl. Lungs cta. Pt denies difficulty breathing or sob. Pt denies chest pain. dressing to right side of neck inact. No redness, swelling, or drainage noted.  elmira drain in place to right side of neck a

## 2017-12-08 ENCOUNTER — TELEPHONE (OUTPATIENT)
Dept: NEUROLOGY | Facility: CLINIC | Age: 52
End: 2017-12-08

## 2017-12-08 ENCOUNTER — OFFICE VISIT (OUTPATIENT)
Dept: NEUROLOGY | Facility: CLINIC | Age: 52
End: 2017-12-08

## 2017-12-08 ENCOUNTER — APPOINTMENT (OUTPATIENT)
Dept: LAB | Facility: HOSPITAL | Age: 52
End: 2017-12-08
Attending: Other
Payer: MEDICAID

## 2017-12-08 VITALS
RESPIRATION RATE: 16 BRPM | HEIGHT: 65 IN | BODY MASS INDEX: 26.33 KG/M2 | HEART RATE: 68 BPM | WEIGHT: 158 LBS | DIASTOLIC BLOOD PRESSURE: 76 MMHG | SYSTOLIC BLOOD PRESSURE: 124 MMHG

## 2017-12-08 DIAGNOSIS — R25.1 TREMOR: Primary | ICD-10-CM

## 2017-12-08 DIAGNOSIS — R25.1 TREMOR: ICD-10-CM

## 2017-12-08 PROCEDURE — 99204 OFFICE O/P NEW MOD 45 MIN: CPT | Performed by: OTHER

## 2017-12-08 PROCEDURE — 84443 ASSAY THYROID STIM HORMONE: CPT

## 2017-12-08 PROCEDURE — 36415 COLL VENOUS BLD VENIPUNCTURE: CPT

## 2017-12-08 RX ORDER — ROPINIROLE 0.25 MG/1
TABLET, FILM COATED ORAL
Qty: 270 TABLET | Refills: 1 | Status: SHIPPED | OUTPATIENT
Start: 2017-12-08 | End: 2018-09-28

## 2017-12-08 NOTE — TELEPHONE ENCOUNTER
Ana Mackay for Authorization of Approval for MRI Brain,  Pending Authorization with Case #[id number]  [phone] with Case #[id number]

## 2017-12-08 NOTE — TELEPHONE ENCOUNTER
----- Message from Chucky Veras MD sent at 12/8/2017 11:23 AM CST -----  Please let the patient know that results of the tests so far were normal.    Thank you    Patient was informed of doctor's message above.  (Patient had blood test this morning,

## 2017-12-08 NOTE — PROGRESS NOTES
Neurology Initial Visit     Referred By: Dr. Martinez ref. provider found    Chief Complaint: Patient presents with:  Tremors: New patient presents today for having tremors on bilateral hands and is losing her balance.  Pt states she has a family history of Par Cancer Neg    • Ovarian Cancer Neg    • DCIS Neg    • LCIS Neg    • BRCA gene + Neg    • Ashkenazi Orthodox Descent Neg          Current Outpatient Prescriptions:   •  ROPINIRole HCl 0.25 MG Oral Tab, Start with 1 pill TID for 1 week, then 2 pills TID for 1 process intact  Memory intact- recent and remote  Mood intact  Fund of knowledge appropriate for education and age    Language intact including: comprehension, naming, repetition, vocabulary    Cranial Nerves:  II.- Visual fields full to confrontation 07/08/2017    07/08/2017   CO2 27 07/08/2017      I have reviewed labs. Assessment   1. Tremor  So far is not clear as to etiology of her tremor.   Some of the features could be suggestive of parkinsonian tremors, however some of the features cou

## 2017-12-11 ENCOUNTER — MED REC SCAN ONLY (OUTPATIENT)
Dept: NEUROLOGY | Facility: CLINIC | Age: 52
End: 2017-12-11

## 2017-12-13 ENCOUNTER — TELEPHONE (OUTPATIENT)
Dept: NEUROLOGY | Facility: CLINIC | Age: 52
End: 2017-12-13

## 2017-12-13 NOTE — PROGRESS NOTES
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 Not surprising, it extends to the surface as it was at the edge of the gland.

## 2017-12-14 NOTE — TELEPHONE ENCOUNTER
Authorization of Approval was given with APPROVAL #[id number] valid until 01/22/2018  Will call patient to inform of the Approval for her MRI, phone unavailable to L/M

## 2018-01-05 ENCOUNTER — HOSPITAL ENCOUNTER (OUTPATIENT)
Dept: MRI IMAGING | Facility: HOSPITAL | Age: 53
Discharge: HOME | End: 2018-01-05
Attending: Other
Payer: MEDICAID

## 2018-01-05 DIAGNOSIS — R25.1 TREMOR: ICD-10-CM

## 2018-01-05 PROCEDURE — 70551 MRI BRAIN STEM W/O DYE: CPT | Performed by: OTHER

## 2018-01-08 ENCOUNTER — TELEPHONE (OUTPATIENT)
Dept: NEUROLOGY | Facility: CLINIC | Age: 53
End: 2018-01-08

## 2018-01-08 NOTE — TELEPHONE ENCOUNTER
----- Message from Angelic Greene MD sent at 1/8/2018 10:04 AM CST -----  Please let patient know that MRI brain didn't show significant abnormalities.

## 2018-02-13 NOTE — ED AVS SNAPSHOT
[facility] Emergency Department    [place]  [address] [address]    Phone:  [phone]    Fax:  [phone]           Kaylynn Susana   MRN: [id number]    Department:  [facility] Emergency Department   Date of Visit:  4/20/2017 [address] [employer] [place] [phone]  Pediatric 868 [id number] Emergency Department   [phone]       To Check ER Wait Times:  TEXT 'ERwait' to 81608      Click [email or web address]      Or call [phone]    If you have any will be contacted. Please make sure we have your correct phone number before you leave. After you leave, you should follow the attached instructions. I have read and understand the instructions given to me by my caregivers.         [facility] XR KNEE ROUTINE (3 VIEWS), LEFT (JQA=[id number]) (Final result) Result time:  04/20/17 20:25:43    Final result    Impression:    CONCLUSION:  No acute findings.            Dictated by: Aliza Browning MD on 4/20/2017 at 20:25       Approved by: Aliza Browning, Yes

## 2018-03-14 ENCOUNTER — OFFICE VISIT (OUTPATIENT)
Dept: NEUROLOGY | Facility: CLINIC | Age: 53
End: 2018-03-14

## 2018-03-14 ENCOUNTER — APPOINTMENT (OUTPATIENT)
Dept: LAB | Facility: HOSPITAL | Age: 53
End: 2018-03-14
Attending: Other
Payer: MEDICAID

## 2018-03-14 ENCOUNTER — MED REC SCAN ONLY (OUTPATIENT)
Dept: NEUROLOGY | Facility: CLINIC | Age: 53
End: 2018-03-14

## 2018-03-14 ENCOUNTER — TELEPHONE (OUTPATIENT)
Dept: NEUROLOGY | Facility: CLINIC | Age: 53
End: 2018-03-14

## 2018-03-14 VITALS
HEIGHT: 65 IN | BODY MASS INDEX: 26.33 KG/M2 | DIASTOLIC BLOOD PRESSURE: 82 MMHG | HEART RATE: 84 BPM | WEIGHT: 158 LBS | RESPIRATION RATE: 16 BRPM | SYSTOLIC BLOOD PRESSURE: 114 MMHG

## 2018-03-14 DIAGNOSIS — R41.3 MEMORY LOSS: ICD-10-CM

## 2018-03-14 DIAGNOSIS — R25.1 TREMOR: Primary | ICD-10-CM

## 2018-03-14 LAB
FOLATE SERPL-MCNC: 8.4 NG/ML
T PALLIDUM AB SER QL: NEGATIVE
TSH SERPL-ACNC: 3.04 UIU/ML (ref 0.45–5.33)
VIT B12 SERPL-MCNC: 557 PG/ML (ref 181–914)

## 2018-03-14 PROCEDURE — 84443 ASSAY THYROID STIM HORMONE: CPT

## 2018-03-14 PROCEDURE — 87389 HIV-1 AG W/HIV-1&-2 AB AG IA: CPT

## 2018-03-14 PROCEDURE — 82607 VITAMIN B-12: CPT | Performed by: OTHER

## 2018-03-14 PROCEDURE — 82746 ASSAY OF FOLIC ACID SERUM: CPT | Performed by: OTHER

## 2018-03-14 PROCEDURE — 36415 COLL VENOUS BLD VENIPUNCTURE: CPT | Performed by: OTHER

## 2018-03-14 PROCEDURE — 86780 TREPONEMA PALLIDUM: CPT

## 2018-03-14 PROCEDURE — 99214 OFFICE O/P EST MOD 30 MIN: CPT | Performed by: OTHER

## 2018-03-14 NOTE — TELEPHONE ENCOUNTER
----- Message from Keena Andrew MD sent at 3/14/2018  3:16 PM CDT -----  Please let the patient know that results of the tests so far were normal.    Thank you

## 2018-03-14 NOTE — TELEPHONE ENCOUNTER
[facility] for authorization of approval of neuropsychological testing cpt codes 98037 x 1 unit, 96118 x 10 units, 90791 x 1 unit. Talked to Werner Sheldon. who initiated request. PA/Reference # [id number].   Will fax clinical notes in the a

## 2018-03-14 NOTE — PROGRESS NOTES
Neurology Follow up Visit     Referred By: Dr. Martinez ref. provider found    Chief Complaint: Patient presents with:  Neurologic Problem: LOV: 12/8/17. F/U on tremors.  Patient states that she continues to have tremors in hands and feels it is getting worst. started to notice that she was becoming much more forgetful misplacing items and has to write down a list of things to do. .     Past Medical History:   Diagnosis Date   • Asthma    • Fibromyalgia    • Lumbar herniated disc    • Lupus    • Scoliosis the rest of the 14 system review was done and was negative      Physical Exam:   03/14/18  0750   BP: 114/82   Pulse: 84   Resp: 16   Weight: 158 lb   Height: 65\"       General: No apparent distress, well nourished, well groomed.   Head- Normocephalic, atr to nose intact  Rapid alternating movements intact    Gait:  Normal posture  Normal physiologic      Labs:      Lab Results  Component Value Date   TSH 2.20 12/08/2017     No results found for: HDL, LDL, TRIG    Lab Results  Component Value Date   HGB 14. 3

## 2018-03-15 LAB — HIV1+2 AB SERPL QL IA: NONREACTIVE

## 2018-03-15 NOTE — TELEPHONE ENCOUNTER
Tiana Segundo is calling from [facility] requesting forms to be faxed to [phone] for neuropsychological testing, will check with CV to see if she has stated

## 2018-03-16 NOTE — TELEPHONE ENCOUNTER
2021 Naomi Estrada reviewer# [place] [address] approved neuropsyche testing with Dr. Alistair Carias. Authorization # [id number] effective 03/19/18 to 06/19/18. Will call Pt. to inform. Pt.  Informed of approval. Detailed information was provided so she ca

## 2018-04-10 ENCOUNTER — HOSPITAL ENCOUNTER (OUTPATIENT)
Dept: ULTRASOUND IMAGING | Facility: HOSPITAL | Age: 53
Discharge: HOME | End: 2018-04-10
Payer: MEDICAID

## 2018-04-10 DIAGNOSIS — N83.201 CYST OF RIGHT OVARY: ICD-10-CM

## 2018-04-10 PROCEDURE — 76830 TRANSVAGINAL US NON-OB: CPT | Performed by: OBSTETRICS & GYNECOLOGY

## 2018-04-10 PROCEDURE — 76856 US EXAM PELVIC COMPLETE: CPT

## 2018-04-10 PROCEDURE — 76856 US EXAM PELVIC COMPLETE: CPT | Performed by: OBSTETRICS & GYNECOLOGY

## 2018-04-10 PROCEDURE — 76830 TRANSVAGINAL US NON-OB: CPT

## 2018-04-13 PROBLEM — M54.2 NECK PAIN: Status: ACTIVE | Noted: 2018-04-13

## 2018-04-24 PROBLEM — M47.22 CERVICAL SPONDYLOSIS WITH RADICULOPATHY: Status: ACTIVE | Noted: 2018-04-24

## 2018-04-24 PROBLEM — D17.79 EPIDURAL LIPOMATOSIS: Status: ACTIVE | Noted: 2018-04-24

## 2018-05-10 NOTE — PROGRESS NOTES
Felipe Zepeda is a 54-year-old woman who I last saw August 28th, 2017, with discoid lupus and diffuse myofascial pain syndrome. She has remained on hydroxychloroquine 400 mg daily. The active lesion on her right cheek is no longer active.   The 2 lesions o are normal.   Cardiovascular: Normal rate, regular rhythm and normal heart sounds. Pulmonary/Chest: Effort normal and breath sounds normal.   Abdominal: Soft. Bowel sounds are normal. She exhibits no mass. There is no tenderness.  There is no rebound and

## 2018-05-11 ENCOUNTER — OFFICE VISIT (OUTPATIENT)
Dept: RHEUMATOLOGY | Facility: CLINIC | Age: 53
End: 2018-05-11

## 2018-05-11 VITALS
SYSTOLIC BLOOD PRESSURE: 101 MMHG | DIASTOLIC BLOOD PRESSURE: 69 MMHG | HEIGHT: 65 IN | BODY MASS INDEX: 26.05 KG/M2 | HEART RATE: 82 BPM | WEIGHT: 156.38 LBS

## 2018-05-11 DIAGNOSIS — M46.92 CERVICAL SPONDYLITIS WITH RADICULITIS (HCC): ICD-10-CM

## 2018-05-11 DIAGNOSIS — Z79.899 LONG-TERM USE OF HYDROXYCHLOROQUINE: Primary | ICD-10-CM

## 2018-05-11 DIAGNOSIS — M48.062 LUMBAR STENOSIS WITH NEUROGENIC CLAUDICATION: ICD-10-CM

## 2018-05-11 DIAGNOSIS — M54.12 CERVICAL SPONDYLITIS WITH RADICULITIS (HCC): ICD-10-CM

## 2018-05-11 DIAGNOSIS — L93.0 DISCOID LUPUS: ICD-10-CM

## 2018-05-11 DIAGNOSIS — M79.7 FIBROMYALGIA: ICD-10-CM

## 2018-05-11 PROCEDURE — 99212 OFFICE O/P EST SF 10 MIN: CPT | Performed by: INTERNAL MEDICINE

## 2018-05-11 PROCEDURE — 99213 OFFICE O/P EST LOW 20 MIN: CPT | Performed by: INTERNAL MEDICINE

## 2018-05-11 RX ORDER — HYDROXYCHLOROQUINE SULFATE 200 MG/1
TABLET, FILM COATED ORAL
Qty: 180 TABLET | Refills: 3 | Status: SHIPPED | OUTPATIENT
Start: 2018-05-11 | End: 2019-07-25

## 2018-06-07 ENCOUNTER — TELEPHONE (OUTPATIENT)
Dept: GASTROENTEROLOGY | Facility: CLINIC | Age: 53
End: 2018-06-07

## 2018-06-07 ENCOUNTER — OFFICE VISIT (OUTPATIENT)
Dept: GASTROENTEROLOGY | Facility: CLINIC | Age: 53
End: 2018-06-07

## 2018-06-07 VITALS
DIASTOLIC BLOOD PRESSURE: 63 MMHG | SYSTOLIC BLOOD PRESSURE: 96 MMHG | BODY MASS INDEX: 27.02 KG/M2 | WEIGHT: 162.19 LBS | HEART RATE: 73 BPM | HEIGHT: 65 IN

## 2018-06-07 DIAGNOSIS — K21.9 GASTROESOPHAGEAL REFLUX DISEASE, ESOPHAGITIS PRESENCE NOT SPECIFIED: Primary | ICD-10-CM

## 2018-06-07 PROCEDURE — 99243 OFF/OP CNSLTJ NEW/EST LOW 30: CPT | Performed by: INTERNAL MEDICINE

## 2018-06-07 RX ORDER — PANTOPRAZOLE SODIUM 40 MG/1
40 TABLET, DELAYED RELEASE ORAL
Qty: 60 TABLET | Refills: 0 | Status: SHIPPED | OUTPATIENT
Start: 2018-06-07 | End: 2019-08-09

## 2018-06-07 RX ORDER — NAPROXEN 500 MG/1
500 TABLET ORAL 2 TIMES DAILY WITH MEALS
COMMUNITY

## 2018-06-07 NOTE — TELEPHONE ENCOUNTER
GI Staff:    Please send my note from today to the patients primary provider    Andreas Velásquez MD  [facility], [facility] - Gastroenterology  6/7/2018  10:09 AM

## 2018-06-07 NOTE — TELEPHONE ENCOUNTER
Scheduled for:  EGD  Provider Name:   Date:  6-25-18  Location:  [facility]  Sedation:  MAC  Time: 2:30pm, arrival 1:30pm    Prep: clear liquids 3 hours prior to procedure.    Meds/Allergies Reconciled?:  yes  Diagnosis with codes:  GERD K21.9  Was patient infor

## 2018-06-07 NOTE — TELEPHONE ENCOUNTER
Pt states that medication is not covered by insurance:       Current Outpatient Prescriptions:     •  Pantoprazole Sodium 40 MG Oral Tab EC, Take 1 tablet (40 mg total) by mouth every morning before breakfast., Disp: 60 tablet, Rfl: 0    Please call.

## 2018-06-07 NOTE — PATIENT INSTRUCTIONS
1. Schedule upper endoscopy (EGD) with monitored anesthesia care (MAC) with Dr. Elsie Maldonado    2.  Gastroesophageal reflux disease (GERD) recommendations    -raise the head of the bed  -avoid tight fitting clothing or belts  -avoid eating late at night and ly

## 2018-06-07 NOTE — H&P
[facility], [facility] - Gastroenterology                                                                                                  Clinic History and Physical No Known Problems Maternal Cousin Female    • No Known Problems Maternal Cousin Male    • No Known Problems Paternal Cousin Female    • No Known Problems Paternal Cousin Male    • No Known Problems Other    • Breast Cancer Neg    • Ovarian Cancer Neg    • non-tender, non-distended  Ext: no lower extremity swelling  Neuro: Alert, Oriented X 3  Skin: no rashes, bruises  Psych: normal affect    Labs/Imaging:     Patient's labs and imaging were reviewed and discussed with patient today.       .  ASSESSMENT/PLAN:

## 2018-06-08 NOTE — TELEPHONE ENCOUNTER
PA submitted through cover my meds with Key: [id number] and should hear determination from [employer] within 3-5 days.

## 2018-06-11 NOTE — TELEPHONE ENCOUNTER
I'd like to try an appeal, but may be best after her endoscopy. In the meantime, we can certainly try ranitidine 150 mg BID which we can prescribe but ideally it would like be better if she can buy over the counter omeprazole 20 mg tablets.  She can take

## 2018-06-11 NOTE — TELEPHONE ENCOUNTER
PA for pantoprazole was denied. Denial reason: You are able to get up to 2 caps a day for a max of 120 days within 365 days. This is the limit shared across all PPI's. Dr. Alhaji Kothari- Please advise. We can try to appeal if needed.

## 2018-06-13 ENCOUNTER — MED REC SCAN ONLY (OUTPATIENT)
Dept: NEUROLOGY | Facility: CLINIC | Age: 53
End: 2018-06-13

## 2018-06-13 ENCOUNTER — TELEPHONE (OUTPATIENT)
Dept: GASTROENTEROLOGY | Facility: CLINIC | Age: 53
End: 2018-06-13

## 2018-06-13 NOTE — TELEPHONE ENCOUNTER
Current Outpatient Prescriptions:  Pantoprazole Sodium 40 MG Oral Tab EC Take 1 tablet (40 mg total) by mouth every morning before breakfast. Disp: 60 tablet Rfl: 0     PA request pls call [phone] Pt  ID# [id number]

## 2018-06-13 NOTE — TELEPHONE ENCOUNTER
Patient was advised by Dr. Eufemia Swanson to buy OTC till she has her EGD . Pantoprazole was denied.

## 2018-06-14 NOTE — TELEPHONE ENCOUNTER
Pt contacted and reviewed Dr. Maile Woods message below, she states her roommate already purchased omeprazole 20mg and she takes it once daily which helps her sxs.  She is aware that after the procedure on 6/25/18, Dr. Maile Woods will review if will need to appeal pantopraz

## 2018-06-25 ENCOUNTER — HOSPITAL ENCOUNTER (OUTPATIENT)
Facility: HOSPITAL | Age: 53
Setting detail: HOSPITAL OUTPATIENT SURGERY
Discharge: HOME | End: 2018-06-25
Attending: INTERNAL MEDICINE | Admitting: INTERNAL MEDICINE
Payer: MEDICAID

## 2018-06-25 ENCOUNTER — SURGERY (OUTPATIENT)
Age: 53
End: 2018-06-25

## 2018-06-25 ENCOUNTER — ANESTHESIA EVENT (OUTPATIENT)
Dept: ENDOSCOPY | Facility: HOSPITAL | Age: 53
End: 2018-06-25
Payer: MEDICAID

## 2018-06-25 ENCOUNTER — ANESTHESIA (OUTPATIENT)
Dept: ENDOSCOPY | Facility: HOSPITAL | Age: 53
End: 2018-06-25
Payer: MEDICAID

## 2018-06-25 ENCOUNTER — TELEPHONE (OUTPATIENT)
Dept: GASTROENTEROLOGY | Facility: CLINIC | Age: 53
End: 2018-06-25

## 2018-06-25 DIAGNOSIS — K21.9 GASTROESOPHAGEAL REFLUX DISEASE, ESOPHAGITIS PRESENCE NOT SPECIFIED: ICD-10-CM

## 2018-06-25 PROCEDURE — 0DB38ZX EXCISION OF LOWER ESOPHAGUS, VIA NATURAL OR ARTIFICIAL OPENING ENDOSCOPIC, DIAGNOSTIC: ICD-10-PCS | Performed by: INTERNAL MEDICINE

## 2018-06-25 PROCEDURE — 0DB68ZX EXCISION OF STOMACH, VIA NATURAL OR ARTIFICIAL OPENING ENDOSCOPIC, DIAGNOSTIC: ICD-10-PCS | Performed by: INTERNAL MEDICINE

## 2018-06-25 PROCEDURE — 43239 EGD BIOPSY SINGLE/MULTIPLE: CPT | Performed by: INTERNAL MEDICINE

## 2018-06-25 RX ORDER — OMEPRAZOLE 20 MG/1
20 CAPSULE, DELAYED RELEASE ORAL
COMMUNITY
End: 2018-09-28

## 2018-06-25 RX ORDER — SODIUM CHLORIDE, SODIUM LACTATE, POTASSIUM CHLORIDE, CALCIUM CHLORIDE 600; 310; 30; 20 MG/100ML; MG/100ML; MG/100ML; MG/100ML
INJECTION, SOLUTION INTRAVENOUS CONTINUOUS
Status: DISCONTINUED | OUTPATIENT
Start: 2018-06-25 | End: 2018-06-25

## 2018-06-25 RX ORDER — LIDOCAINE HYDROCHLORIDE 10 MG/ML
INJECTION, SOLUTION EPIDURAL; INFILTRATION; INTRACAUDAL; PERINEURAL AS NEEDED
Status: DISCONTINUED | OUTPATIENT
Start: 2018-06-25 | End: 2018-06-25 | Stop reason: SURG

## 2018-06-25 RX ORDER — NALOXONE HYDROCHLORIDE 0.4 MG/ML
80 INJECTION, SOLUTION INTRAMUSCULAR; INTRAVENOUS; SUBCUTANEOUS AS NEEDED
Status: DISCONTINUED | OUTPATIENT
Start: 2018-06-25 | End: 2018-06-25

## 2018-06-25 RX ADMIN — SODIUM CHLORIDE, SODIUM LACTATE, POTASSIUM CHLORIDE, CALCIUM CHLORIDE: 600; 310; 30; 20 INJECTION, SOLUTION INTRAVENOUS at 14:40:00

## 2018-06-25 RX ADMIN — LIDOCAINE HYDROCHLORIDE 20 MG: 10 INJECTION, SOLUTION EPIDURAL; INFILTRATION; INTRACAUDAL; PERINEURAL at 14:24:00

## 2018-06-25 RX ADMIN — SODIUM CHLORIDE, SODIUM LACTATE, POTASSIUM CHLORIDE, CALCIUM CHLORIDE: 600; 310; 30; 20 INJECTION, SOLUTION INTRAVENOUS at 14:21:00

## 2018-06-25 NOTE — ANESTHESIA PREPROCEDURE EVALUATION
Anesthesia PreOp Note    HPI:     Gracie Redmond is a 46year old female who presents for preoperative consultation requested by: Angel Pastrana MD    Date of Surgery: 6/25/2018    Procedure(s):  ESOPHAGOGASTRODUODENOSCOPY (EGD)  Indication: Aramis Borges reviewed. No pertinent surgical history.       Prescriptions Prior to Admission:  omeprazole 20 MG Oral Capsule Delayed Release Take 20 mg by mouth every morning before breakfast. Disp:  Rfl:  6/23/2018   naproxen 500 MG Oral Tab Take 500 mg by mouth 2 (two Advent Descent Neg        Social History  Social History   Marital status: Single  Spouse name: N/A    Years of education: N/A  Number of children: N/A     Occupational History  None on file     Social History Main Topics   Smoking status: Current Every Da KENIA  6/25/2018 2:16 PM

## 2018-06-25 NOTE — TELEPHONE ENCOUNTER
GI Staff:    I prescribed patient pantoprazole 6/7/18. Says wasn't covered by insurance. Had EGD and needs this medication. I would like to send a letter to her insurance company for letter of necessity.      Thanks    Kwadwo Navarro MD  [facility], [facility]

## 2018-06-25 NOTE — TELEPHONE ENCOUNTER
Margarita Harrison-    Would you be able to initiate PA so once it is denied we can proceed with appeal letter? Please and thank you.

## 2018-06-25 NOTE — H&P
History & Physical Examination    Patient Name: Tanya Carter  MRN: [id number]  CSN: [id number]  YOB: 1965    Diagnosis: GERD      Prescriptions Prior to Admission:  omeprazole 20 MG Oral Capsule Delayed Release Take 20 mg by mouth every morning Onset   • No Known Problems Self    • No Known Problems Mother    • No Known Problems Sister    • No Known Problems Daughter    • No Known Problems Maternal Grandmother    • No Known Problems Paternal Grandmother    • No Known Problems Maternal Aunt    • N

## 2018-06-25 NOTE — ANESTHESIA POSTPROCEDURE EVALUATION
Patient: Gianna Ruano    Procedure Summary     Date:  06/25/18 Room / Location:  [address] [place] [address] ENDOSCOPY 05 / [address] [place] [address] ENDOSCOPY    Anesthesia Start:  2821 Anesthesia Stop:  3528    Procedure:  ESOPHAGOGASTRODUODENOSCOPY (EGD) (N/A ) Diagnosis:       Gastroesophageal reflu

## 2018-06-25 NOTE — OPERATIVE REPORT
Esophagogastroduodenoscopy (EGD) Report    Kaylynn KENT 1965 Age 46year old   PCP Joleen Bass NP Endoscopist Drea Clark MD     Date of procedure: 18    Procedure: EGD w/ biopsy     Pre-operative diagnosis: GERD    Post-operativ biopsy sites which was then controlled with placement of two endoscopic hemoclips. The esophageal mucosa appeared normal otherwise. 2. Stomach: There was a 3 cm sliding hiatal hernia. The stomach distended normally. Normal rugal folds were seen.  The pylor

## 2018-06-26 VITALS
HEART RATE: 79 BPM | HEIGHT: 65 IN | SYSTOLIC BLOOD PRESSURE: 122 MMHG | DIASTOLIC BLOOD PRESSURE: 86 MMHG | RESPIRATION RATE: 17 BRPM | OXYGEN SATURATION: 99 % | WEIGHT: 163 LBS | BODY MASS INDEX: 27.16 KG/M2

## 2018-06-26 NOTE — TELEPHONE ENCOUNTER
Spoke to pt and let her know in order to start the appeal process, we need her signature on the AOR form. She is going to ask her roommate if she can receive faxes at her work and therefore she can then sign and fax back to us.  She will call us back today

## 2018-06-26 NOTE — TELEPHONE ENCOUNTER
Pama Franco- Dr. Radford Collet message below was generated into letter format for you. Please let me know if there is anything I can assist you with for the appeal, thanks.

## 2018-06-26 NOTE — TELEPHONE ENCOUNTER
Letter should read.     To whom it may concern:    Ms. Lavinia Aguilar is under my gastrointestinal care and has chronic symptoms of GERD which is complicated by esophagitis noted on upper endoscopy June 25, 2018 and requires chronic once a day oral proton pump inhib

## 2018-06-27 ENCOUNTER — TELEPHONE (OUTPATIENT)
Dept: GASTROENTEROLOGY | Facility: CLINIC | Age: 53
End: 2018-06-27

## 2018-06-27 NOTE — TELEPHONE ENCOUNTER
Patient contacted and advised that we cannot email it to her. Patient would like it mailed to her instead. Form mailed.

## 2018-06-27 NOTE — TELEPHONE ENCOUNTER
Called patient and discussed with her results of EGD biopsies showing what represents short segment non-dysplastic rivas's esophagus.  Discussed the nature of this being a pre-cancerous lesion but overall low risk still for development of esophageal cance

## 2018-06-28 NOTE — TELEPHONE ENCOUNTER
Entered into EPIC:Recall EGD in 1 year per . Last EGD done 6/25/18, next due 6/25/19. Snapshot updated.

## 2018-07-13 NOTE — TELEPHONE ENCOUNTER
Started appeal for denial of pantoprazole. Faxed over denial letter, OV note, letter from Dr. Yancy Irving, and path from [facility] to [facility] and [address] [place], [place] [address]. Fax: [phone] and we should get a determination within 15 business days.

## 2018-07-16 ENCOUNTER — OFFICE VISIT (OUTPATIENT)
Dept: SLEEP CENTER | Age: 53
End: 2018-07-16
Attending: INTERNAL MEDICINE
Payer: MEDICAID

## 2018-07-16 DIAGNOSIS — G47.33 OBSTRUCTIVE SLEEP APNEA (ADULT) (PEDIATRIC): ICD-10-CM

## 2018-07-16 NOTE — TELEPHONE ENCOUNTER
Riley/JUAN calling to requesting a new letter or includew hat we are appealing and why we are appealing (they have clinicals) what was received does not address what is being appealed (drug appealing and why).    Fax: [phone] Jose Nuñez (R#[phone]

## 2018-07-16 NOTE — TELEPHONE ENCOUNTER
Dr. Kaleigh Salas-    Please advise on new appeal letter as the insurance did not accept the current appeal letter- would like more descriptive information as to why medication is medically necessary, thank you.

## 2018-07-17 NOTE — TELEPHONE ENCOUNTER
Dr. Radford Collet message generated to letter format and sent copy of operative report, surgical pathology, AOR form, and clinical notes back to Marzena Gee at [facility] ([id number]) w/ message that this was a HP and time-sensitive based on her request. Faxed to [phone].

## 2018-07-17 NOTE — TELEPHONE ENCOUNTER
To whom it may concern:     Ms. Zain Shah is under my gastrointestinal care and has chronic symptoms of GERD which is complicated by esophagitis and March's esophagus noted on upper endoscopy June 25, 2018 and requires chronic once a day oral proton pump inh

## 2018-07-17 NOTE — TELEPHONE ENCOUNTER
Dr. Grujit Machuca letter must be submitted by today, otherwise insurance states they will close the case. Please advise, thank you.

## 2018-07-20 NOTE — PROCEDURES
[address] [facility]  Accredited by the [place] [employer] (AASM)    PATIENT'S NAME: Stacie Bustamante   ATTENDING PHYSICIAN: Rick Lagunas. Yuli Bhakta MD   REFERRING PHYSICIAN: Rick Lagunas.  Yuli Bhakta MD   PATIENT ACCOUNT #: [id number] LOCATION: [facility]

## 2018-07-20 NOTE — TELEPHONE ENCOUNTER
Spoke to Romayne Mako at [facility] ([phone]) and confirmed they received the new appeal letter, AOR, and clinicals.     -Pending review at this time.

## 2018-07-27 NOTE — TELEPHONE ENCOUNTER
Contacted [facility] with ref # [id number]    Appeal still under review at this time.

## 2018-08-06 NOTE — TELEPHONE ENCOUNTER
Spoke to Star Uribe at [facility] ([phone]) and states the case has been approved on 7/30/18. Spoke to [employer] at [facility] and states the RX was approved w/ $0 copay.     Pt contacted and informed, she verbalized understanding and will f/u with pha

## 2018-09-27 PROBLEM — M54.16 LUMBAR RADICULITIS: Status: ACTIVE | Noted: 2018-09-27

## 2018-10-05 ENCOUNTER — ORDER TRANSCRIPTION (OUTPATIENT)
Dept: SLEEP CENTER | Facility: HOSPITAL | Age: 53
End: 2018-10-05

## 2018-10-05 DIAGNOSIS — R06.81 APNEA: Primary | ICD-10-CM

## 2018-10-11 ENCOUNTER — APPOINTMENT (OUTPATIENT)
Dept: PHYSICAL THERAPY | Facility: HOSPITAL | Age: 53
End: 2018-10-11
Attending: PHYSICAL MEDICINE & REHABILITATION
Payer: MEDICAID

## 2018-10-15 ENCOUNTER — OFFICE VISIT (OUTPATIENT)
Dept: PHYSICAL THERAPY | Facility: HOSPITAL | Age: 53
End: 2018-10-15
Attending: PHYSICAL MEDICINE & REHABILITATION
Payer: MEDICAID

## 2018-10-15 PROCEDURE — 97162 PT EVAL MOD COMPLEX 30 MIN: CPT

## 2018-10-15 PROCEDURE — 97110 THERAPEUTIC EXERCISES: CPT

## 2018-10-15 PROCEDURE — 97140 MANUAL THERAPY 1/> REGIONS: CPT

## 2018-10-15 NOTE — PROGRESS NOTES
LUMBAR SPINE EVALUATION:   Referring Physician: Dr. Sapphire Calhoun  Date of Onset: July 2018 Date of Service: 10/15/2018   Diagnosis: Chronic bilateral low back pain without sciatica (M54.5,G89.29)  PATIENT SUMMARY:   Abraham Danielson is a 48year old y/o female who impairments below.     Precautions: None     OBJECTIVE:   Observation/Posture:  kypholordotic, rounded shoulders, anterior pelvic tilt, elevated R iliac crest.  Gait: extension rotation syndrome lumbar spine, reduced upper trunk rotation, decreased hip ext, to pain. Improve walking tolerance to at least 30 min to be able to go grocery shopping. Be able to negotiate 1 flight of stairs daily to get in/out of her apartment with less pain and difficulty.    Be able to tolerate up to 30 min standing activities t

## 2018-10-18 ENCOUNTER — APPOINTMENT (OUTPATIENT)
Dept: PHYSICAL THERAPY | Facility: HOSPITAL | Age: 53
End: 2018-10-18
Attending: PHYSICAL MEDICINE & REHABILITATION
Payer: MEDICAID

## 2018-10-19 ENCOUNTER — TELEPHONE (OUTPATIENT)
Dept: RHEUMATOLOGY | Facility: CLINIC | Age: 53
End: 2018-10-19

## 2018-10-19 NOTE — TELEPHONE ENCOUNTER
Saba Montalvo to clarify refill request. Hydroxychloroquine script sent in 5/11/2018 for 1yr supply. Pharmacy to dispense available refill and contact pt.

## 2018-10-22 ENCOUNTER — APPOINTMENT (OUTPATIENT)
Dept: PHYSICAL THERAPY | Facility: HOSPITAL | Age: 53
End: 2018-10-22
Attending: PHYSICAL MEDICINE & REHABILITATION
Payer: MEDICAID

## 2018-10-25 ENCOUNTER — APPOINTMENT (OUTPATIENT)
Dept: PHYSICAL THERAPY | Facility: HOSPITAL | Age: 53
End: 2018-10-25
Attending: PHYSICAL MEDICINE & REHABILITATION
Payer: MEDICAID

## 2018-10-29 ENCOUNTER — APPOINTMENT (OUTPATIENT)
Dept: PHYSICAL THERAPY | Facility: HOSPITAL | Age: 53
End: 2018-10-29
Attending: PHYSICAL MEDICINE & REHABILITATION
Payer: MEDICAID

## 2018-10-30 ENCOUNTER — OFFICE VISIT (OUTPATIENT)
Dept: PHYSICAL THERAPY | Facility: HOSPITAL | Age: 53
End: 2018-10-30
Attending: PHYSICAL MEDICINE & REHABILITATION
Payer: MEDICAID

## 2018-10-30 PROCEDURE — 97140 MANUAL THERAPY 1/> REGIONS: CPT

## 2018-10-30 PROCEDURE — 97110 THERAPEUTIC EXERCISES: CPT

## 2018-10-30 NOTE — PROGRESS NOTES
Diagnosis: Chronic bilateral low back pain without sciatica (M54.5,G89.29)  Authorized # of Visits:  11 ([facility]) Expires 11/21/18         Next MD visit: none scheduled  Fall Risk: standard         Precautions: n/a           Medication Changes since l

## 2018-11-01 ENCOUNTER — APPOINTMENT (OUTPATIENT)
Dept: PHYSICAL THERAPY | Facility: HOSPITAL | Age: 53
End: 2018-11-01
Attending: PHYSICAL MEDICINE & REHABILITATION
Payer: MEDICAID

## 2018-11-01 ENCOUNTER — TELEPHONE (OUTPATIENT)
Dept: PHYSICAL THERAPY | Facility: HOSPITAL | Age: 53
End: 2018-11-01

## 2018-11-05 ENCOUNTER — TELEPHONE (OUTPATIENT)
Dept: PHYSICAL THERAPY | Facility: HOSPITAL | Age: 53
End: 2018-11-05

## 2018-11-05 ENCOUNTER — APPOINTMENT (OUTPATIENT)
Dept: PHYSICAL THERAPY | Facility: HOSPITAL | Age: 53
End: 2018-11-05
Attending: PHYSICAL MEDICINE & REHABILITATION
Payer: MEDICAID

## 2018-11-07 ENCOUNTER — TELEPHONE (OUTPATIENT)
Dept: PHYSICAL THERAPY | Facility: HOSPITAL | Age: 53
End: 2018-11-07

## 2018-11-09 ENCOUNTER — APPOINTMENT (OUTPATIENT)
Dept: PHYSICAL THERAPY | Facility: HOSPITAL | Age: 53
End: 2018-11-09
Attending: PHYSICAL MEDICINE & REHABILITATION
Payer: MEDICAID

## 2018-11-12 ENCOUNTER — APPOINTMENT (OUTPATIENT)
Dept: PHYSICAL THERAPY | Facility: HOSPITAL | Age: 53
End: 2018-11-12
Attending: PHYSICAL MEDICINE & REHABILITATION
Payer: MEDICAID

## 2018-11-12 PROCEDURE — 97140 MANUAL THERAPY 1/> REGIONS: CPT

## 2018-11-12 PROCEDURE — 97110 THERAPEUTIC EXERCISES: CPT

## 2018-11-12 NOTE — PROGRESS NOTES
Diagnosis: Chronic bilateral low back pain without sciatica (M54.5,G89.29)  Authorized # of Visits:  11 ([facility]) Expires 11/21/18         Next MD visit: none scheduled  Fall Risk: standard         Precautions: n/a           Medication Changes since l to get in/out of her apartment with less pain and difficulty. (NO CHANGE)  Be able to tolerate up to 30 min standing activities to perform chores at home with less difficulty and pain.  (NO CHANGE)    Plan: Continue manual therapy to restore segmental lumba

## 2018-11-15 ENCOUNTER — APPOINTMENT (OUTPATIENT)
Dept: PHYSICAL THERAPY | Facility: HOSPITAL | Age: 53
End: 2018-11-15
Attending: PHYSICAL MEDICINE & REHABILITATION
Payer: MEDICAID

## 2018-11-20 ENCOUNTER — OFFICE VISIT (OUTPATIENT)
Dept: PHYSICAL THERAPY | Facility: HOSPITAL | Age: 53
End: 2018-11-20
Attending: PHYSICAL MEDICINE & REHABILITATION
Payer: MEDICAID

## 2018-11-20 PROCEDURE — 97140 MANUAL THERAPY 1/> REGIONS: CPT

## 2018-11-20 PROCEDURE — 97110 THERAPEUTIC EXERCISES: CPT

## 2018-11-20 NOTE — PROGRESS NOTES
Diagnosis: Chronic bilateral low back pain without sciatica (M54.5,G89.29)  Authorized # of Visits:  [facility] ([facility] Expires 11/21/18         Next MD visit: none scheduled  Fall Risk: standard         Precautions: n/a           Medication Changes since l Assessment: Despite improvements in thoracolumbar mobility with improving segmental control with TA and multifidi activation, decrease in pain has been slight 25% with new ROM.  Spent significant time educating pt on use of TA and PPT during transitiona

## 2018-11-27 ENCOUNTER — OFFICE VISIT (OUTPATIENT)
Dept: PHYSICAL THERAPY | Facility: HOSPITAL | Age: 53
End: 2018-11-27
Attending: PHYSICAL MEDICINE & REHABILITATION
Payer: MEDICAID

## 2018-11-27 PROCEDURE — 97110 THERAPEUTIC EXERCISES: CPT

## 2018-11-27 PROCEDURE — 97140 MANUAL THERAPY 1/> REGIONS: CPT

## 2018-11-27 NOTE — PROGRESS NOTES
Patient Name: Teto Vernon, : 1965, MRN: [id number]   Date:  2018  Referring Physician:  Reinaldo Park    Diagnosis: Chronic bilateral low back pain without sciatica (M54.5,G89.29)    Progress Summary    Pt has attended 5, cancelled 5, and SIJ correction  FMP seated lumbar flexion tissue technique   EX:  -Nu Step R 5, arms 7 x10 min  -PPT frog position with belt x5 min  -S/L clams x15 hold 5 sec bilat  -Segmental lumbar flexion and extension in sitting and standing x3 ea  -Test and measures

## 2018-11-29 ENCOUNTER — APPOINTMENT (OUTPATIENT)
Dept: PHYSICAL THERAPY | Facility: HOSPITAL | Age: 53
End: 2018-11-29
Attending: PHYSICAL MEDICINE & REHABILITATION
Payer: MEDICAID

## 2019-04-01 ENCOUNTER — TELEPHONE (OUTPATIENT)
Dept: GASTROENTEROLOGY | Facility: CLINIC | Age: 54
End: 2019-04-01

## 2019-04-01 NOTE — TELEPHONE ENCOUNTER
----- Message from Louisa Michelle RN sent at 6/27/2018  4:05 PM CDT -----  Regarding: Recall EGD in 1 year  Recall EGD in 1 year per Dr. Esteban Calzada.  EGD done 6/25/18

## 2019-04-08 ENCOUNTER — TELEPHONE (OUTPATIENT)
Dept: GASTROENTEROLOGY | Facility: CLINIC | Age: 54
End: 2019-04-08

## 2019-04-08 DIAGNOSIS — Z87.19 HISTORY OF BARRETT'S ESOPHAGUS: Primary | ICD-10-CM

## 2019-04-08 NOTE — TELEPHONE ENCOUNTER
Last Procedure, Date, MD:  EGD 6/27/18 by Dr. Anibal Rodriguez  Last Diagnosis: Impression:  1. LA grade B esophagitis s/p biopsy  2. A 3 cm sliding hiatal hernia  3.  Mild non-specific gastropathy s/p biopsy  Short segment non-dysplastic rivas's esophagus  Recalled fo

## 2019-04-09 NOTE — TELEPHONE ENCOUNTER
Scheduled for:  EGD 92525  Provider Name: Dr. Alhaji Kothari  Date:     From-6/7/19  To-6/3/19  Location:  [facility]  Sedation:  MAC  Time:      From-0830   To-0900 (pt is aware to arrive at 0800)   Prep:  NPO after midnight, sent via [place]  Meds/Allergies Reconcil

## 2019-04-09 NOTE — TELEPHONE ENCOUNTER
Please schedule patient for EGD with MAC at the hospitals for diagnosis of March's esophagus.     Thanks    Supa Pedraza MD  [address] [facility]  4/8/2019  7:16 PM

## 2019-05-29 NOTE — TELEPHONE ENCOUNTER
Rescheduled for:  EGD - 55800    Provider Name:  Dr. Colleen Mercado  Date:  FROM - 6/3/19          TO - 7/8/19  Location:  [facility]  Sedation:  MAC  Time:  FROM - 9:00 am         TO - 8:45 am (pt is aware to arrive at 7:45 am)  Prep:  NPO after midnight, Prep instru

## 2019-06-14 PROBLEM — G47.33 OSA (OBSTRUCTIVE SLEEP APNEA): Status: ACTIVE | Noted: 2019-06-14

## 2019-06-21 ENCOUNTER — HOSPITAL ENCOUNTER (OUTPATIENT)
Dept: MAMMOGRAPHY | Facility: HOSPITAL | Age: 54
Discharge: HOME | End: 2019-06-21
Attending: NURSE PRACTITIONER
Payer: MEDICAID

## 2019-06-21 DIAGNOSIS — Z01.411 ENCOUNTER FOR GYNECOLOGICAL EXAMINATION (GENERAL) (ROUTINE) WITH ABNORMAL FINDINGS: ICD-10-CM

## 2019-06-21 DIAGNOSIS — R92.8 ABNORMAL MAMMOGRAM OF RIGHT BREAST: ICD-10-CM

## 2019-06-21 PROCEDURE — 77066 DX MAMMO INCL CAD BI: CPT | Performed by: NURSE PRACTITIONER

## 2019-06-22 ENCOUNTER — HOSPITAL ENCOUNTER (OUTPATIENT)
Dept: MRI IMAGING | Facility: HOSPITAL | Age: 54
Discharge: HOME | End: 2019-06-22
Attending: NURSE PRACTITIONER
Payer: MEDICAID

## 2019-06-22 DIAGNOSIS — R51.9 HA (HEADACHE): ICD-10-CM

## 2019-06-22 DIAGNOSIS — H53.8 BLURRED VISION: ICD-10-CM

## 2019-06-22 PROCEDURE — 70551 MRI BRAIN STEM W/O DYE: CPT | Performed by: NURSE PRACTITIONER

## 2019-06-24 ENCOUNTER — HOSPITAL ENCOUNTER (OUTPATIENT)
Dept: CV DIAGNOSTICS | Facility: HOSPITAL | Age: 54
Discharge: HOME | End: 2019-06-24
Attending: NURSE PRACTITIONER
Payer: MEDICAID

## 2019-06-24 DIAGNOSIS — R06.02 SOB (SHORTNESS OF BREATH): ICD-10-CM

## 2019-06-24 PROCEDURE — 93306 TTE W/DOPPLER COMPLETE: CPT | Performed by: NURSE PRACTITIONER

## 2019-06-24 PROCEDURE — 93010 ELECTROCARDIOGRAM REPORT: CPT | Performed by: NURSE PRACTITIONER

## 2019-06-24 PROCEDURE — 93005 ELECTROCARDIOGRAM TRACING: CPT

## 2019-07-05 ENCOUNTER — HOSPITAL ENCOUNTER (OUTPATIENT)
Dept: MRI IMAGING | Facility: HOSPITAL | Age: 54
Discharge: HOME | End: 2019-07-05
Attending: ORTHOPAEDIC SURGERY
Payer: MEDICAID

## 2019-07-05 ENCOUNTER — TELEPHONE (OUTPATIENT)
Dept: GASTROENTEROLOGY | Facility: CLINIC | Age: 54
End: 2019-07-05

## 2019-07-05 DIAGNOSIS — R93.7 ABNORMAL MRI, THORACIC SPINE: ICD-10-CM

## 2019-07-05 PROCEDURE — A9575 INJ GADOTERATE MEGLUMI 0.1ML: HCPCS | Performed by: ORTHOPAEDIC SURGERY

## 2019-07-05 PROCEDURE — 72157 MRI CHEST SPINE W/O & W/DYE: CPT | Performed by: ORTHOPAEDIC SURGERY

## 2019-07-05 NOTE — TELEPHONE ENCOUNTER
I received a call from Chong Keane in Endo today which he stated when PAT called her today they asked her if there was someone to watch her after the procedure she stated she didn't. So, they cancelled the procedure.  I spoke with her today to discuss the cancellat

## 2019-07-17 NOTE — MR AVS SNAPSHOT
After Visit Summary   7/16/2018    Pradip Selby    MRN: [id number]           Visit Information     Date & Time  7/16/2018 10:00 AM Provider  [address]  Phone  [phone]      Allergies as of Pt calling is currently on citalopram 20 mg and daughter was recently diagnosed (Thursday) with type 1 diabetes.  This news has been really stressful on the family.  Pt is waking up at 2 am to check daughter's blood sugar and has pounding heart, on the brink of tears all the time, and cannot go back to sleep.  Thinks her meds need to be adjusted.    Scheduled at 10:40 am today with Dr. Buck for depression med check.    Emelyn BAUMANN RN  EP Triage     will help us do so. For more information on [employer], please visit [email or web address]                   DO YOU KNOW WHERE TO GO? Injury & illness are never convenient.  If you are dealing with a   non-emergency, consider your o

## 2019-07-19 ENCOUNTER — TELEPHONE (OUTPATIENT)
Dept: GASTROENTEROLOGY | Facility: CLINIC | Age: 54
End: 2019-07-19

## 2019-07-19 NOTE — TELEPHONE ENCOUNTER
Received disability papers from RN-   Emailed to TAMIR- copy in folder - [facility]DA

## 2019-07-25 RX ORDER — HYDROXYCHLOROQUINE SULFATE 200 MG/1
TABLET, FILM COATED ORAL
Qty: 180 TABLET | Refills: 0 | Status: SHIPPED | OUTPATIENT
Start: 2019-07-25

## 2019-08-01 NOTE — PROGRESS NOTES
Minoo Swartz is a 51-year-old woman who I last saw May 11th of 2018, with discoid lupus, osteoarthritis, and diffuse myofascial pain syndrome. She has remained on hydroxychloroquine 400 mg daily since.   Her discoid lesion on her cheeks and in front of her rhythm and normal heart sounds. Pulmonary/Chest: Effort normal and breath sounds normal.   Abdominal: Soft. Bowel sounds are normal. She exhibits no mass. There is no tenderness. There is no rebound and no guarding.    Musculoskeletal: She exhibits no ed

## 2019-08-05 ENCOUNTER — APPOINTMENT (OUTPATIENT)
Dept: LAB | Facility: HOSPITAL | Age: 54
End: 2019-08-05
Attending: INTERNAL MEDICINE
Payer: MEDICAID

## 2019-08-05 ENCOUNTER — OFFICE VISIT (OUTPATIENT)
Dept: RHEUMATOLOGY | Facility: CLINIC | Age: 54
End: 2019-08-05
Payer: MEDICAID

## 2019-08-05 VITALS
BODY MASS INDEX: 30.98 KG/M2 | SYSTOLIC BLOOD PRESSURE: 112 MMHG | HEART RATE: 82 BPM | HEIGHT: 60 IN | DIASTOLIC BLOOD PRESSURE: 74 MMHG | WEIGHT: 157.81 LBS

## 2019-08-05 DIAGNOSIS — R53.83 FATIGUE, UNSPECIFIED TYPE: ICD-10-CM

## 2019-08-05 DIAGNOSIS — L93.0 DISCOID LUPUS: ICD-10-CM

## 2019-08-05 DIAGNOSIS — M15.9 PRIMARY OSTEOARTHRITIS INVOLVING MULTIPLE JOINTS: ICD-10-CM

## 2019-08-05 DIAGNOSIS — M79.7 FIBROMYALGIA: ICD-10-CM

## 2019-08-05 DIAGNOSIS — Z79.899 LONG-TERM USE OF PLAQUENIL: Primary | ICD-10-CM

## 2019-08-05 DIAGNOSIS — G47.33 OSA (OBSTRUCTIVE SLEEP APNEA): ICD-10-CM

## 2019-08-05 LAB
ALBUMIN SERPL-MCNC: 4.3 G/DL (ref 3.4–5)
ALBUMIN/GLOB SERPL: 1 {RATIO} (ref 1–2)
ALP LIVER SERPL-CCNC: 122 U/L (ref 41–108)
ALT SERPL-CCNC: 54 U/L (ref 13–56)
ANION GAP SERPL CALC-SCNC: 4 MMOL/L (ref 0–18)
AST SERPL-CCNC: 25 U/L (ref 15–37)
BILIRUB SERPL-MCNC: 0.2 MG/DL (ref 0.1–2)
BUN BLD-MCNC: 13 MG/DL (ref 7–18)
BUN/CREAT SERPL: 18.8 (ref 10–20)
CALCIUM BLD-MCNC: 9.1 MG/DL (ref 8.5–10.1)
CHLORIDE SERPL-SCNC: 106 MMOL/L (ref 98–112)
CO2 SERPL-SCNC: 28 MMOL/L (ref 21–32)
CREAT BLD-MCNC: 0.69 MG/DL (ref 0.55–1.02)
DEPRECATED RDW RBC AUTO: 42.7 FL (ref 35.1–46.3)
ERYTHROCYTE [DISTWIDTH] IN BLOOD BY AUTOMATED COUNT: 14.4 % (ref 11–15)
ERYTHROCYTE [SEDIMENTATION RATE] IN BLOOD: 12 MM/HR (ref 0–30)
GLOBULIN PLAS-MCNC: 4.1 G/DL (ref 2.8–4.4)
GLUCOSE BLD-MCNC: 85 MG/DL (ref 70–99)
HCT VFR BLD AUTO: 41.3 % (ref 35–48)
HGB BLD-MCNC: 13 G/DL (ref 12–16)
M PROTEIN MFR SERPL ELPH: 8.4 G/DL (ref 6.4–8.2)
MCH RBC QN AUTO: 25.9 PG (ref 26–34)
MCHC RBC AUTO-ENTMCNC: 31.5 G/DL (ref 31–37)
MCV RBC AUTO: 82.4 FL (ref 80–100)
OSMOLALITY SERPL CALC.SUM OF ELEC: 285 MOSM/KG (ref 275–295)
PATIENT FASTING: NO
PLATELET # BLD AUTO: 369 10(3)UL (ref 150–450)
POTASSIUM SERPL-SCNC: 4.2 MMOL/L (ref 3.5–5.1)
RBC # BLD AUTO: 5.01 X10(6)UL (ref 3.8–5.3)
SODIUM SERPL-SCNC: 138 MMOL/L (ref 136–145)
TSI SER-ACNC: 1.77 MIU/ML (ref 0.36–3.74)
WBC # BLD AUTO: 6.4 X10(3) UL (ref 4–11)

## 2019-08-05 PROCEDURE — 99214 OFFICE O/P EST MOD 30 MIN: CPT | Performed by: INTERNAL MEDICINE

## 2019-08-05 PROCEDURE — 85652 RBC SED RATE AUTOMATED: CPT

## 2019-08-05 PROCEDURE — 82306 VITAMIN D 25 HYDROXY: CPT

## 2019-08-05 PROCEDURE — 84443 ASSAY THYROID STIM HORMONE: CPT

## 2019-08-05 PROCEDURE — 80053 COMPREHEN METABOLIC PANEL: CPT

## 2019-08-05 PROCEDURE — 36415 COLL VENOUS BLD VENIPUNCTURE: CPT

## 2019-08-05 PROCEDURE — 85027 COMPLETE CBC AUTOMATED: CPT

## 2019-08-07 LAB — 25(OH)D3 SERPL-MCNC: 27.3 NG/ML (ref 30–100)

## 2019-08-08 ENCOUNTER — TELEPHONE (OUTPATIENT)
Dept: RHEUMATOLOGY | Facility: CLINIC | Age: 54
End: 2019-08-08

## 2019-08-08 RX ORDER — CHOLECALCIFEROL (VITAMIN D3) 1250 MCG
CAPSULE ORAL
Qty: 12 CAPSULE | Refills: 0 | Status: SHIPPED | OUTPATIENT
Start: 2019-08-08 | End: 2019-10-04 | Stop reason: ALTCHOICE

## 2019-08-08 NOTE — TELEPHONE ENCOUNTER
verified. Results and recommendations below discussed with pt. Pt verbalized understanding and agreeable with plan.

## 2019-08-08 NOTE — TELEPHONE ENCOUNTER
Please let her know that her blood counts, chemistries, thyroid test, and inflammation marker (sed rate) are normal.    She doesn't have systemic lupus. Continue hydroxychloroquine for discoid lupus. I'll see her back in 1 year.     Her vitamin D level

## 2019-09-18 ENCOUNTER — OFFICE VISIT (OUTPATIENT)
Dept: NEUROLOGY | Facility: CLINIC | Age: 54
End: 2019-09-18
Payer: MEDICAID

## 2019-09-18 VITALS
HEART RATE: 72 BPM | SYSTOLIC BLOOD PRESSURE: 92 MMHG | WEIGHT: 157 LBS | DIASTOLIC BLOOD PRESSURE: 68 MMHG | BODY MASS INDEX: 26.16 KG/M2 | HEIGHT: 65 IN

## 2019-09-18 DIAGNOSIS — G43.011 INTRACTABLE MIGRAINE WITHOUT AURA AND WITH STATUS MIGRAINOSUS: Primary | ICD-10-CM

## 2019-09-18 PROCEDURE — 99244 OFF/OP CNSLTJ NEW/EST MOD 40: CPT | Performed by: OTHER

## 2019-09-18 RX ORDER — TOPIRAMATE 25 MG/1
TABLET ORAL
Qty: 120 TABLET | Refills: 3 | Status: SHIPPED | OUTPATIENT
Start: 2019-09-18 | End: 2020-01-24 | Stop reason: ALTCHOICE

## 2019-09-18 NOTE — PROGRESS NOTES
Ms Linda Lynn, relates over a 10-year history of daily bifrontal vertex occipital bilateral face jaw throbbing headaches with photophobia, phonophobia, occasional blurred vision. No visual aura. No diplopia. No nocturnal awakening.   She uses Advil with no shannan Chronic daily vascular headaches. I placed her on Topamax 25 mg p.o. twice daily for a week and then 50 mg p.o. twice daily. She is using daily twice daily naproxen. Therefore will not use Daypro.   She is also on duloxetine, fluoxetine, which potentiall

## 2019-10-03 ENCOUNTER — OFFICE VISIT (OUTPATIENT)
Dept: OPHTHALMOLOGY | Facility: CLINIC | Age: 54
End: 2019-10-03
Payer: MEDICAID

## 2019-10-03 DIAGNOSIS — Z79.899 LONG-TERM USE OF PLAQUENIL: Primary | ICD-10-CM

## 2019-10-03 PROCEDURE — 99243 OFF/OP CNSLTJ NEW/EST LOW 30: CPT | Performed by: OPHTHALMOLOGY

## 2019-10-03 NOTE — PROGRESS NOTES
Raymond Medina is a 47year old female. HPI:     HPI     Consult      Additional comments: Consult per Dr. Aroldo Stanley is here for a plaquenil exam. Pt is on Plaquenil 200MG 2 pills po QD for Lupus since 1991.  Pt goes to [facility] • BRCA gene + Neg    • Ashkenazi Jew Descent Neg    • Macular degeneration Neg        Social History: Social History    Tobacco Use      Smoking status: Current Every Day Smoker        Types: Cigarettes        Start date: 9/28/1977      Smokeless tob Visual Fields       Left Right     Full Full          Extraocular Movement       Right Left     Full, Ortho Full, Ortho          Dilation     Both eyes:  Paremyd X2 @ 10:10 AM            Additional Tests     Amsler       Right Left     Normal Miss were placed in this encounter.       Meds This Visit:  Requested Prescriptions      No prescriptions requested or ordered in this encounter        Follow up instructions:  Return in about 1 year (around 10/3/2020) for Plaquenil eye exam.    10/3/2019  [place]

## 2019-10-03 NOTE — PATIENT INSTRUCTIONS
Long-term use of Plaquenil   No evidence of plaquenil toxicity in either eye. Will follow in 1 year for a plaquenil eye exam based on current guidelines.    Patient is approved to continue on plaquenil as directed by their PCP or rheumatologist.  Reassured

## 2019-10-03 NOTE — ASSESSMENT & PLAN NOTE
No evidence of plaquenil toxicity in either eye. Will follow in 1 year for a plaquenil eye exam based on current guidelines.    Patient is approved to continue on plaquenil as directed by their PCP or rheumatologist.  Reassured patient that other eyes loo

## 2019-10-30 ENCOUNTER — OFFICE VISIT (OUTPATIENT)
Dept: CARDIOLOGY CLINIC | Facility: CLINIC | Age: 54
End: 2019-10-30
Payer: MEDICAID

## 2019-10-30 VITALS
BODY MASS INDEX: 27 KG/M2 | SYSTOLIC BLOOD PRESSURE: 102 MMHG | RESPIRATION RATE: 20 BRPM | HEART RATE: 86 BPM | WEIGHT: 161 LBS | DIASTOLIC BLOOD PRESSURE: 64 MMHG

## 2019-10-30 DIAGNOSIS — R07.9 CHEST PAIN AT REST: Primary | ICD-10-CM

## 2019-10-30 DIAGNOSIS — Z82.49 FAMILY HISTORY OF PREMATURE CAD: ICD-10-CM

## 2019-10-30 DIAGNOSIS — Z72.0 TOBACCO ABUSE: ICD-10-CM

## 2019-10-30 PROCEDURE — 99204 OFFICE O/P NEW MOD 45 MIN: CPT | Performed by: INTERNAL MEDICINE

## 2019-10-30 RX ORDER — PANTOPRAZOLE SODIUM 40 MG/1
TABLET, DELAYED RELEASE ORAL
Refills: 2 | COMMUNITY
Start: 2019-10-07

## 2019-10-30 NOTE — PATIENT INSTRUCTIONS
Nuclear treadmill stress test within the next few days. Blood test within the next few days. Follow-up with APN in 4 weeks and with me as needed.

## 2019-10-30 NOTE — PROGRESS NOTES
Lea Kaplan is a 47year old female. HPI:   Patient is here for new patient appointment. She is complaining of pressure-like sensation in the chest for last few months. She denies any relation to activity or food.   Last for few seconds and is relieved on Scoliosis    • Sleep apnea     scheduled for sleep study July      Social History:  Social History    Tobacco Use      Smoking status: Current Every Day Smoker        Types: Cigarettes        Start date: 9/28/1977      Smokeless tobacco: Never Used      To with me as needed depending on the results.            Lisa Dietrich MD  10/30/2019  9:29 AM

## 2019-11-01 ENCOUNTER — TELEPHONE (OUTPATIENT)
Dept: CARDIOLOGY CLINIC | Facility: CLINIC | Age: 54
End: 2019-11-01

## 2019-11-01 NOTE — TELEPHONE ENCOUNTER
Patient ID: [id number] Time: 11/1/2019 9:52 AM  Patient Name: Summer       This member does not require authorization for this service as of 09/01/2019.  Refer to the Provider News/Updates notice at [facility] for a list of codes no longer requiring auth

## 2019-11-01 NOTE — TELEPHONE ENCOUNTER
CARD NUC STRESS TEST LEXISCAN (CPT=93015/03080/)   Dx: Chest pain at rest [R07.9 (ICD-10-CM)]; Tobacco abuse [Z72.0 (ICD-10-CM)];  Family history of premature CAD [Z80.52 (ICD-10-CM

## 2019-11-07 ENCOUNTER — HOSPITAL ENCOUNTER (OUTPATIENT)
Dept: NUCLEAR MEDICINE | Facility: HOSPITAL | Age: 54
Discharge: HOME | End: 2019-11-07
Attending: INTERNAL MEDICINE
Payer: MEDICAID

## 2019-11-07 ENCOUNTER — HOSPITAL ENCOUNTER (OUTPATIENT)
Dept: CV DIAGNOSTICS | Facility: HOSPITAL | Age: 54
Discharge: HOME | End: 2019-11-07
Attending: INTERNAL MEDICINE
Payer: MEDICAID

## 2019-11-07 ENCOUNTER — HOSPITAL ENCOUNTER (OUTPATIENT)
Dept: GENERAL RADIOLOGY | Facility: HOSPITAL | Age: 54
Discharge: HOME | End: 2019-11-07
Attending: PHYSICAL MEDICINE & REHABILITATION
Payer: MEDICAID

## 2019-11-07 DIAGNOSIS — R07.9 CHEST PAIN AT REST: ICD-10-CM

## 2019-11-07 DIAGNOSIS — M25.511 RIGHT SHOULDER PAIN, UNSPECIFIED CHRONICITY: ICD-10-CM

## 2019-11-07 DIAGNOSIS — Z82.49 FAMILY HISTORY OF PREMATURE CAD: ICD-10-CM

## 2019-11-07 DIAGNOSIS — Z72.0 TOBACCO ABUSE: ICD-10-CM

## 2019-11-07 PROCEDURE — 36415 COLL VENOUS BLD VENIPUNCTURE: CPT

## 2019-11-07 PROCEDURE — 93018 CV STRESS TEST I&R ONLY: CPT | Performed by: INTERNAL MEDICINE

## 2019-11-07 PROCEDURE — 80061 LIPID PANEL: CPT

## 2019-11-07 PROCEDURE — 78452 HT MUSCLE IMAGE SPECT MULT: CPT | Performed by: INTERNAL MEDICINE

## 2019-11-07 PROCEDURE — 93016 CV STRESS TEST SUPVJ ONLY: CPT | Performed by: INTERNAL MEDICINE

## 2019-11-07 PROCEDURE — 73030 X-RAY EXAM OF SHOULDER: CPT | Performed by: PHYSICAL MEDICINE & REHABILITATION

## 2019-11-07 PROCEDURE — 93017 CV STRESS TEST TRACING ONLY: CPT | Performed by: INTERNAL MEDICINE

## 2019-12-03 ENCOUNTER — OFFICE VISIT (OUTPATIENT)
Dept: CARDIOLOGY CLINIC | Facility: CLINIC | Age: 54
End: 2019-12-03
Payer: MEDICAID

## 2019-12-03 VITALS
BODY MASS INDEX: 27.79 KG/M2 | DIASTOLIC BLOOD PRESSURE: 68 MMHG | HEIGHT: 65 IN | RESPIRATION RATE: 18 BRPM | WEIGHT: 166.81 LBS | HEART RATE: 75 BPM | SYSTOLIC BLOOD PRESSURE: 116 MMHG

## 2019-12-03 DIAGNOSIS — R07.9 CHEST PAIN, UNSPECIFIED TYPE: Primary | ICD-10-CM

## 2019-12-03 PROCEDURE — 99214 OFFICE O/P EST MOD 30 MIN: CPT | Performed by: NURSE PRACTITIONER

## 2019-12-03 RX ORDER — METOPROLOL TARTRATE 50 MG/1
50 TABLET, FILM COATED ORAL 2 TIMES DAILY
Qty: 2 TABLET | Refills: 0 | Status: SHIPPED | OUTPATIENT
Start: 2019-12-03 | End: 2020-01-24 | Stop reason: ALTCHOICE

## 2019-12-03 NOTE — PROGRESS NOTES
Jordan Espinosa is a 47year old female. Patient presents with: Follow - Up: 4 weeks  Chest Pain: under left breast    HPI:   Patient comes in today for checkup. She sees Dr. Lucio Dixon.   She has been having some chest discomfort in the center of her chest the left hydrocortisone 2.5 % External Cream Apply to AA on chest once to twice daily x 2 weeks when flaring.  (Patient not taking: Reported on 12/3/2019 ) 30 g 0      Past Medical History:   Diagnosis Date   • Anxiety state    • Asthma    • Back problem     injecti CTA FRACTIONAL FLOW RESERVE ANALYSIS (CPT=0503T/0502T)          Patient is doing okay from a cardiac standpoint.   She is still complaining of chest discomfort is the same as it was before 10 normal stress test but with pain continuing and risk factors incl

## 2019-12-18 ENCOUNTER — HOSPITAL ENCOUNTER (OUTPATIENT)
Dept: CT IMAGING | Facility: HOSPITAL | Age: 54
Discharge: HOME | End: 2019-12-18
Attending: NURSE PRACTITIONER
Payer: MEDICAID

## 2019-12-18 VITALS
BODY MASS INDEX: 27.66 KG/M2 | RESPIRATION RATE: 15 BRPM | WEIGHT: 166 LBS | DIASTOLIC BLOOD PRESSURE: 68 MMHG | HEIGHT: 65 IN | HEART RATE: 68 BPM | SYSTOLIC BLOOD PRESSURE: 104 MMHG

## 2019-12-18 DIAGNOSIS — R07.9 CHEST PAIN, UNSPECIFIED TYPE: ICD-10-CM

## 2019-12-18 PROCEDURE — 82565 ASSAY OF CREATININE: CPT

## 2019-12-18 PROCEDURE — 75574 CT ANGIO HRT W/3D IMAGE: CPT | Performed by: INTERNAL MEDICINE

## 2019-12-18 PROCEDURE — 75574 CT ANGIO HRT W/3D IMAGE: CPT | Performed by: NURSE PRACTITIONER

## 2019-12-18 RX ORDER — METOPROLOL TARTRATE 5 MG/5ML
INJECTION INTRAVENOUS
Status: COMPLETED
Start: 2019-12-18 | End: 2019-12-18

## 2019-12-18 RX ORDER — 0.9 % SODIUM CHLORIDE 0.9 %
500 VIAL (ML) INJECTION ONCE
Status: COMPLETED | OUTPATIENT
Start: 2019-12-18 | End: 2019-12-18

## 2019-12-18 RX ORDER — NITROGLYCERIN 0.4 MG/1
0.4 TABLET SUBLINGUAL ONCE
Status: COMPLETED | OUTPATIENT
Start: 2019-12-18 | End: 2019-12-18

## 2019-12-18 RX ORDER — DILTIAZEM HYDROCHLORIDE 5 MG/ML
INJECTION INTRAVENOUS
Status: COMPLETED
Start: 2019-12-18 | End: 2019-12-18

## 2019-12-18 RX ORDER — DILTIAZEM HYDROCHLORIDE 5 MG/ML
5 INJECTION INTRAVENOUS SEE ADMIN INSTRUCTIONS
Status: DISCONTINUED | OUTPATIENT
Start: 2019-12-18 | End: 2019-12-20

## 2019-12-18 RX ORDER — METOPROLOL TARTRATE 5 MG/5ML
5 INJECTION INTRAVENOUS SEE ADMIN INSTRUCTIONS
Status: DISCONTINUED | OUTPATIENT
Start: 2019-12-18 | End: 2019-12-20

## 2019-12-18 RX ADMIN — NITROGLYCERIN 0.4 MG: 0.4 TABLET SUBLINGUAL at 11:56:00

## 2019-12-18 RX ADMIN — METOPROLOL TARTRATE 5 MG: 5 INJECTION INTRAVENOUS at 11:25:00

## 2019-12-18 RX ADMIN — METOPROLOL TARTRATE 5 MG: 5 INJECTION INTRAVENOUS at 11:37:00

## 2019-12-18 RX ADMIN — 0.9 % SODIUM CHLORIDE 500 ML: 0.9 % VIAL (ML) INJECTION at 09:32:00

## 2019-12-18 RX ADMIN — 0.9 % SODIUM CHLORIDE 500 ML: 0.9 % VIAL (ML) INJECTION at 10:13:00

## 2019-12-18 RX ADMIN — DILTIAZEM HYDROCHLORIDE 5 MG: 5 INJECTION INTRAVENOUS at 11:47:00

## 2019-12-18 RX ADMIN — Medication 50 MG: at 09:13:00

## 2019-12-18 RX ADMIN — METOPROLOL TARTRATE 5 MG: 5 INJECTION INTRAVENOUS at 11:42:00

## 2019-12-18 RX ADMIN — Medication 50 MG: at 10:32:00

## 2019-12-18 RX ADMIN — METOPROLOL TARTRATE 5 MG: 5 INJECTION INTRAVENOUS at 11:30:00

## 2019-12-18 NOTE — IMAGING NOTE
TO RAD HOLDING AT 0858   HX TAKEN PT CONSENTED AT 0907 VS  HR 68  /68    18 GAUGE IV STARTED AT 0930 POC TESTING COMPLETED GFR = >60   CREATINE = 0.6    CTA ORDERED BY JULISSA WOODS  WAS PT GIVEN CTA  PREMEDS YES 50 MG PO METOPROLOL X2 DOSES

## 2019-12-19 ENCOUNTER — OFFICE VISIT (OUTPATIENT)
Dept: GASTROENTEROLOGY | Facility: CLINIC | Age: 54
End: 2019-12-19
Payer: MEDICAID

## 2019-12-19 ENCOUNTER — TELEPHONE (OUTPATIENT)
Dept: GASTROENTEROLOGY | Facility: CLINIC | Age: 54
End: 2019-12-19

## 2019-12-19 ENCOUNTER — TELEPHONE (OUTPATIENT)
Dept: CARDIOLOGY CLINIC | Facility: CLINIC | Age: 54
End: 2019-12-19

## 2019-12-19 VITALS
SYSTOLIC BLOOD PRESSURE: 96 MMHG | WEIGHT: 168 LBS | HEIGHT: 65 IN | HEART RATE: 73 BPM | BODY MASS INDEX: 27.99 KG/M2 | DIASTOLIC BLOOD PRESSURE: 61 MMHG

## 2019-12-19 DIAGNOSIS — K21.9 GASTROESOPHAGEAL REFLUX DISEASE, ESOPHAGITIS PRESENCE NOT SPECIFIED: ICD-10-CM

## 2019-12-19 DIAGNOSIS — K59.00 CONSTIPATION, UNSPECIFIED CONSTIPATION TYPE: ICD-10-CM

## 2019-12-19 DIAGNOSIS — Z12.11 SCREENING FOR COLORECTAL CANCER: Primary | ICD-10-CM

## 2019-12-19 DIAGNOSIS — K22.70 BARRETT'S ESOPHAGUS WITHOUT DYSPLASIA: ICD-10-CM

## 2019-12-19 DIAGNOSIS — Z12.12 SCREENING FOR COLORECTAL CANCER: Primary | ICD-10-CM

## 2019-12-19 DIAGNOSIS — Z12.11 COLON CANCER SCREENING: Primary | ICD-10-CM

## 2019-12-19 PROCEDURE — 99214 OFFICE O/P EST MOD 30 MIN: CPT | Performed by: INTERNAL MEDICINE

## 2019-12-19 RX ORDER — POLYETHYLENE GLYCOL 3350, SODIUM CHLORIDE, SODIUM BICARBONATE, POTASSIUM CHLORIDE 420; 11.2; 5.72; 1.48 G/4L; G/4L; G/4L; G/4L
POWDER, FOR SOLUTION ORAL
Qty: 1 BOTTLE | Refills: 0 | Status: ON HOLD | OUTPATIENT
Start: 2019-12-19 | End: 2020-03-13

## 2019-12-19 RX ORDER — FAMOTIDINE 20 MG/1
TABLET ORAL
Refills: 2 | COMMUNITY
Start: 2019-12-13 | End: 2020-01-24 | Stop reason: ALTCHOICE

## 2019-12-19 NOTE — PATIENT INSTRUCTIONS
1. Schedule colonoscopy and upper endoscopy with monitored anesthesia care (MAC) at the hospital     2.  bowel prep from pharmacy ([facility] )    3. Continue all medications for procedure    4. Read all bowel prep instructions carefully    5.  AV

## 2019-12-19 NOTE — TELEPHONE ENCOUNTER
CTA over read:  Notes recorded by JULISSA English on 12/19/2019 at 12:40 PM CST  pls send to PCP to determine significance of airway inflammation  CTA:  Notes recorded by JULISSA English on 12/19/2019 at 12:39 PM CST  Results reviewed a

## 2019-12-19 NOTE — TELEPHONE ENCOUNTER
Scheduled for:  Colonoscopy - [address] & EGD - [phone]  Provider Name:  Dr. Lane Player  Date:  3/13/20  Location:  [facility]  Sedation:  MAC  Time:  8:00 am (pt is aware to arrive at 7:00 am)  Prep:  Trilyte, Prep instructions were given to pt in the office, pt verbal

## 2019-12-19 NOTE — PROGRESS NOTES
[facility] - Gastroenterology                                                                                                  Clinic Progress Note    Patient pr Reymundo Esquivel MD at [facility] ENDOSCOPY   • HX PAROTIDECTOMY     • PAROTIDECTOMY Right 12/6/2017    Performed by Sarah Freeman MD at [facility] MAIN OR      Family Hx:   Family History   Problem Relation Age of Onset   • No Known Problems Self    • No Known P meals.     • Albuterol Sulfate  (90 Base) MCG/ACT Inhalation Aero Soln Inhale into the lungs as needed for Wheezing. • Cyclobenzaprine HCl 5 MG Oral Tab Take 1-2 PO Q HS. (Patient taking differently: as needed. Take 1-2 PO Q HS.  Pt states takes associations with concerning problems (i.e. Dementia, CKD, etc) and that these studies do not indicate causality and that some studies refute these findings and that many of the patients had many risk factors for these problems like HTN, Dm, older age, etc The patient elected to proceed with colonoscopy with intervention [i.e. polypectomy, control of bleeding, dilatation, etc.] as indicated. Orders This Visit:  No orders of the defined types were placed in this encounter.     Meds This Visit:  Requested Pr

## 2020-03-13 ENCOUNTER — HOSPITAL ENCOUNTER (OUTPATIENT)
Facility: HOSPITAL | Age: 55
Setting detail: HOSPITAL OUTPATIENT SURGERY
Discharge: HOME | End: 2020-03-13
Attending: INTERNAL MEDICINE | Admitting: INTERNAL MEDICINE
Payer: MEDICAID

## 2020-03-13 ENCOUNTER — ANESTHESIA EVENT (OUTPATIENT)
Dept: ENDOSCOPY | Facility: HOSPITAL | Age: 55
End: 2020-03-13
Payer: MEDICAID

## 2020-03-13 ENCOUNTER — ANESTHESIA (OUTPATIENT)
Dept: ENDOSCOPY | Facility: HOSPITAL | Age: 55
End: 2020-03-13
Payer: MEDICAID

## 2020-03-13 VITALS
HEART RATE: 73 BPM | TEMPERATURE: 98 F | RESPIRATION RATE: 18 BRPM | BODY MASS INDEX: 27.82 KG/M2 | HEIGHT: 65 IN | DIASTOLIC BLOOD PRESSURE: 76 MMHG | SYSTOLIC BLOOD PRESSURE: 115 MMHG | WEIGHT: 167 LBS | OXYGEN SATURATION: 100 %

## 2020-03-13 DIAGNOSIS — K21.9 GASTROESOPHAGEAL REFLUX DISEASE, ESOPHAGITIS PRESENCE NOT SPECIFIED: ICD-10-CM

## 2020-03-13 DIAGNOSIS — Z12.11 COLON CANCER SCREENING: ICD-10-CM

## 2020-03-13 PROCEDURE — 0DB48ZX EXCISION OF ESOPHAGOGASTRIC JUNCTION, VIA NATURAL OR ARTIFICIAL OPENING ENDOSCOPIC, DIAGNOSTIC: ICD-10-PCS | Performed by: INTERNAL MEDICINE

## 2020-03-13 PROCEDURE — 0DBM8ZX EXCISION OF DESCENDING COLON, VIA NATURAL OR ARTIFICIAL OPENING ENDOSCOPIC, DIAGNOSTIC: ICD-10-PCS | Performed by: INTERNAL MEDICINE

## 2020-03-13 PROCEDURE — 0DBN8ZX EXCISION OF SIGMOID COLON, VIA NATURAL OR ARTIFICIAL OPENING ENDOSCOPIC, DIAGNOSTIC: ICD-10-PCS | Performed by: INTERNAL MEDICINE

## 2020-03-13 PROCEDURE — 36415 COLL VENOUS BLD VENIPUNCTURE: CPT | Performed by: INTERNAL MEDICINE

## 2020-03-13 PROCEDURE — 0DB78ZX EXCISION OF STOMACH, PYLORUS, VIA NATURAL OR ARTIFICIAL OPENING ENDOSCOPIC, DIAGNOSTIC: ICD-10-PCS | Performed by: INTERNAL MEDICINE

## 2020-03-13 PROCEDURE — 88312 SPECIAL STAINS GROUP 1: CPT | Performed by: INTERNAL MEDICINE

## 2020-03-13 PROCEDURE — 88305 TISSUE EXAM BY PATHOLOGIST: CPT | Performed by: INTERNAL MEDICINE

## 2020-03-13 RX ORDER — SODIUM CHLORIDE, SODIUM LACTATE, POTASSIUM CHLORIDE, CALCIUM CHLORIDE 600; 310; 30; 20 MG/100ML; MG/100ML; MG/100ML; MG/100ML
INJECTION, SOLUTION INTRAVENOUS CONTINUOUS
Status: DISCONTINUED | OUTPATIENT
Start: 2020-03-13 | End: 2020-03-13

## 2020-03-13 RX ORDER — SODIUM CHLORIDE, SODIUM LACTATE, POTASSIUM CHLORIDE, CALCIUM CHLORIDE 600; 310; 30; 20 MG/100ML; MG/100ML; MG/100ML; MG/100ML
INJECTION, SOLUTION INTRAVENOUS CONTINUOUS
Status: CANCELLED | OUTPATIENT
Start: 2020-03-13

## 2020-03-13 RX ORDER — NALOXONE HYDROCHLORIDE 0.4 MG/ML
80 INJECTION, SOLUTION INTRAMUSCULAR; INTRAVENOUS; SUBCUTANEOUS AS NEEDED
Status: CANCELLED | OUTPATIENT
Start: 2020-03-13 | End: 2020-03-13

## 2020-03-13 RX ADMIN — SODIUM CHLORIDE, SODIUM LACTATE, POTASSIUM CHLORIDE, CALCIUM CHLORIDE: 600; 310; 30; 20 INJECTION, SOLUTION INTRAVENOUS at 09:19:00

## 2020-03-13 RX ADMIN — SODIUM CHLORIDE, SODIUM LACTATE, POTASSIUM CHLORIDE, CALCIUM CHLORIDE: 600; 310; 30; 20 INJECTION, SOLUTION INTRAVENOUS at 08:49:00

## 2020-03-13 NOTE — ANESTHESIA PREPROCEDURE EVALUATION
Anesthesia PreOp Note    HPI:     Tamara Bowman is a 47year old female who presents for preoperative consultation requested by: Dung Cobb MD    Date of Surgery: 3/13/2020    Procedure(s):  COLONOSCOPY  ESOPHAGOGASTRODUODENOSCOPY (EGD)  Indication: Lumbar strain, initial encounter         Date Noted: 06/13/2017      Pain of left lower extremity         Date Noted: 06/13/2017      Internal derangement of knee, left         Date Noted: 06/13/2017        Past Medical History:   Diagnosis Date   • Anxiet Tab, Take 500 mg by mouth 2 (two) times daily with meals. , Disp: , Rfl: , 3/11/2020  Albuterol Sulfate  (90 Base) MCG/ACT Inhalation Aero Soln, Inhale into the lungs as needed for Wheezing., Disp: , Rfl: , 3/6/2020  Cyclobenzaprine HCl 5 MG Oral Tab date: 9/28/1974      Smokeless tobacco: Never Used      Tobacco comment: 2/ day     Substance and Sexual Activity      Alcohol use: No      Drug use: No      Sexual activity: Not on file    Lifestyle      Physical activity:        Days per week: Not on emmy Airway   Mallampati: I  TM distance: >3 FB  Neck ROM: full  Dental          Pulmonary - normal exam   (+) COPD mild, asthma, sleep apnea on CPAP,   Cardiovascular - negative ROS and normal exam    Neuro/Psych    (+)  neuromuscular disease, anxiety/panic at

## 2020-03-13 NOTE — ANESTHESIA POSTPROCEDURE EVALUATION
Patient: Ab Kobi    Procedure Summary     Date:  03/13/20 Room / Location:  [facility] ENDOSCOPY 05 / [facility] ENDOSCOPY    Anesthesia Start:  6798 Anesthesia Stop:  1820    Procedures:       COLONOSCOPY (N/A )      ESOPHAGOGASTRODUODENOSCOPY (EGD) (N/A ) Diagnosis

## 2020-03-13 NOTE — OPERATIVE REPORT
ESOPHAGOGASTRODUODENOSCOPY (EGD) & COLONOSCOPY REPORT    Gianna Ruano     1965 Age 47year old   PCP JULISSA Camacho Endoscopist Bianca Ny MD     Date of procedure: 20    Procedure: EGD w/ biopsies & Colonoscopy w/cold snare and cold bi then carefully withdrew the instrument from the patient who tolerated the procedure well. Complications: None    EGD findings:      1. Esophagus:  The squamocolumnar junction was noted at 32 cm and appeared irregular with two columns of salmon colored m you have not received your pathology results either by phone or letter within 2 weeks, please call our office at [phone].     Specimens: gastric, esophageal, descending and sigmoid polyps

## 2020-03-13 NOTE — H&P
Pre Procedure History & Physical Examination    Patient Name: Teto Vernon  MRN: [id number]  [facility]: [id number]  YOB: 1965    Diagnosis: reflux esophagitis, constipation and screening    Tiotropium Bromide Monohydrate (SPIRIVA RESPIMAT) 2.5 MCG/AC • Lupus ([address]    • Migraines    • JACK (obstructive sleep apnea) HST 7-16-18    AHI [address] 74%   • Scoliosis    • Sleep apnea     scheduled for sleep study July     Past Surgical History:   Procedure Laterality Date   • CYST REMOVAL      ovaries intolerance  MUSCULOSKELETAL:  negative for joint effusion/severe erythema  BEHAVIOR/PSYCH:  negative for psychotic behavior      PHYSICAL EXAM:   /65 (BP Location: Left arm)   Pulse 73   Temp 98.2 °F (36.8 °C) (Oral)   Resp 16   Ht 5' 5\" (1.651 m)

## 2020-03-16 ENCOUNTER — TELEPHONE (OUTPATIENT)
Dept: GASTROENTEROLOGY | Facility: CLINIC | Age: 55
End: 2020-03-16

## 2020-03-16 NOTE — TELEPHONE ENCOUNTER
Cali Bowen MD  [facility]             GI staff: please place recall for colonoscopy in 5 years and EGD in 3 years        Results letter sent to patient via [address] and printed and mailed out to patient.     Patient outreach recall enter

## 2020-03-16 NOTE — TELEPHONE ENCOUNTER
Anjel Reece MD  [facility]             GI staff: please place recall for colonoscopy in 5 years and EGD in 3 years      Patient outreach recall entered for 5 years for colonoscopy.

## 2021-02-17 ENCOUNTER — APPOINTMENT (RX ONLY)
Dept: URBAN - METROPOLITAN AREA CLINIC 77 | Facility: CLINIC | Age: 56
Setting detail: DERMATOLOGY
End: 2021-02-17

## 2021-02-17 ENCOUNTER — TELEPHONE (OUTPATIENT)
Dept: NEUROLOGY | Facility: CLINIC | Age: 56
End: 2021-02-17

## 2021-02-17 DIAGNOSIS — L93.0 DISCOID LUPUS ERYTHEMATOSUS: ICD-10-CM

## 2021-02-17 PROCEDURE — 99203 OFFICE O/P NEW LOW 30 MIN: CPT

## 2021-02-17 PROCEDURE — ? ADDITIONAL NOTES

## 2021-02-17 PROCEDURE — ? MEDICATION COUNSELING

## 2021-02-17 PROCEDURE — ? PRESCRIPTION

## 2021-02-17 PROCEDURE — ? PATIENT SPECIFIC COUNSELING

## 2021-02-17 RX ORDER — TRIAMCINOLONE ACETONIDE 1 MG/G
CREAM TOPICAL BID
Qty: 1 | Refills: 0 | Status: ERX | COMMUNITY
Start: 2021-02-17

## 2021-02-17 RX ORDER — TOPIRAMATE 25 MG/1
TABLET ORAL
Qty: 120 TABLET | Refills: 3 | OUTPATIENT
Start: 2021-02-17

## 2021-02-17 RX ADMIN — TRIAMCINOLONE ACETONIDE: 1 CREAM TOPICAL at 00:00

## 2021-02-17 ASSESSMENT — LOCATION DETAILED DESCRIPTION DERM
LOCATION DETAILED: LEFT CENTRAL MALAR CHEEK
LOCATION DETAILED: RIGHT SUPERIOR CENTRAL MALAR CHEEK

## 2021-02-17 ASSESSMENT — LOCATION SIMPLE DESCRIPTION DERM
LOCATION SIMPLE: LEFT CHEEK
LOCATION SIMPLE: RIGHT CHEEK

## 2021-02-17 ASSESSMENT — LOCATION ZONE DERM: LOCATION ZONE: FACE

## 2021-02-17 NOTE — TELEPHONE ENCOUNTER
DENIED Refill request for TOPIRAMATE 25 MG TABLETS, take 1 tab po twice daily for 1 week increase to 2 tabs po twice daily, 120 Tablets with 0 Refills    LOV: 9/18/19  NOV: None    Last Refilled: 2/23/20    Sent patient [employer] message to schedule follow u

## 2021-02-17 NOTE — PROCEDURE: MEDICATION COUNSELING
Detail Level: Detailed Cephalexin Pregnancy And Lactation Text: This medication is Pregnancy Category B and considered safe during pregnancy.  It is also excreted in breast milk but can be used safely for shorter doses.

## 2021-02-17 NOTE — PROCEDURE: MEDICATION COUNSELING
Xelrafiqz Pregnancy And Lactation Text: This medication is Pregnancy Category D and is not considered safe during pregnancy.  The risk during breast feeding is also uncertain.

## 2021-02-17 NOTE — PROCEDURE: ADDITIONAL NOTES
Additional Notes: Patient consent was obtained to proceed with the visit and recommended plan of care after discussion of all risks and benefits, including the risks of COVID-19 exposure.
Detail Level: Simple
Detail Level: Zone
Additional Notes: Stressed strict sun avoidance.
Render Risk Assessment In Note?: no

## 2021-02-17 NOTE — PROCEDURE: MEDICATION COUNSELING
24-Dec-2019 Stelara Counseling:  I discussed with the patient the risks of ustekinumab including but not limited to immunosuppression, malignancy, posterior leukoencephalopathy syndrome, and serious infections.  The patient understands that monitoring is required including a PPD at baseline and must alert us or the primary physician if symptoms of infection or other concerning signs are noted.

## 2021-04-01 ENCOUNTER — TELEPHONE (OUTPATIENT)
Dept: NEUROLOGY | Facility: CLINIC | Age: 56
End: 2021-04-01

## 2021-04-01 NOTE — TELEPHONE ENCOUNTER
Refill request for TOPIRAMATE 25 MG TABLETS     LOV: 9/18/19    Called & spoke with patient. Patient states she no longer needs prescription due to her establishing care in [place].  Advised we will call the pharmacy to let them know to discontinue med refi

## 2021-05-17 ENCOUNTER — APPOINTMENT (RX ONLY)
Dept: URBAN - METROPOLITAN AREA CLINIC 77 | Facility: CLINIC | Age: 56
Setting detail: DERMATOLOGY
End: 2021-05-17

## 2021-05-17 DIAGNOSIS — L93.0 DISCOID LUPUS ERYTHEMATOSUS: ICD-10-CM

## 2021-05-17 PROCEDURE — ? ADDITIONAL NOTES

## 2021-05-17 PROCEDURE — ? MEDICATION COUNSELING

## 2021-05-17 PROCEDURE — ? PRESCRIPTION

## 2021-05-17 PROCEDURE — 99213 OFFICE O/P EST LOW 20 MIN: CPT

## 2021-05-17 PROCEDURE — ? PATIENT SPECIFIC COUNSELING

## 2021-05-17 RX ORDER — BETAMETHASONE VALERATE 1 MG/G
CREAM TOPICAL
Qty: 1 | Refills: 1 | Status: ERX | COMMUNITY
Start: 2021-05-17

## 2021-05-17 RX ADMIN — BETAMETHASONE VALERATE: 1 CREAM TOPICAL at 00:00

## 2021-05-17 ASSESSMENT — LOCATION DETAILED DESCRIPTION DERM
LOCATION DETAILED: RIGHT SUPERIOR CENTRAL MALAR CHEEK
LOCATION DETAILED: LEFT CENTRAL MALAR CHEEK

## 2021-05-17 ASSESSMENT — LOCATION SIMPLE DESCRIPTION DERM
LOCATION SIMPLE: LEFT CHEEK
LOCATION SIMPLE: RIGHT CHEEK

## 2021-05-17 ASSESSMENT — LOCATION ZONE DERM: LOCATION ZONE: FACE

## 2021-05-17 NOTE — PROCEDURE: MEDICATION COUNSELING
Is This A New Presentation, Or A Follow-Up?: Rash Hydroxychloroquine Counseling:  I discussed with the patient that a baseline ophthalmologic exam is needed at the start of therapy and every year thereafter while on therapy. A CBC may also be warranted for monitoring.  The side effects of this medication were discussed with the patient, including but not limited to agranulocytosis, aplastic anemia, seizures, rashes, retinopathy, and liver toxicity. Patient instructed to call the office should any adverse effect occur.  The patient verbalized understanding of the proper use and possible adverse effects of Plaquenil.  All the patient's questions and concerns were addressed.

## 2021-05-17 NOTE — PROCEDURE: ADDITIONAL NOTES
Detail Level: Zone
Additional Notes: Stressed strict sun avoidance.
Render Risk Assessment In Note?: no
Additional Notes: Patient consent was obtained to proceed with the visit and recommended plan of care after discussion of all risks and benefits, including the risks of COVID-19 exposure.
Detail Level: Simple
Additional Notes: Stopping triamcinolone cream and start betamethasone cream onto  back BID

## 2021-06-21 ENCOUNTER — APPOINTMENT (RX ONLY)
Dept: URBAN - METROPOLITAN AREA CLINIC 77 | Facility: CLINIC | Age: 56
Setting detail: DERMATOLOGY
End: 2021-06-21

## 2021-06-21 DIAGNOSIS — L93.0 DISCOID LUPUS ERYTHEMATOSUS: ICD-10-CM | Status: IMPROVED

## 2021-06-21 PROCEDURE — ? TREATMENT REGIMEN

## 2021-06-21 PROCEDURE — 99213 OFFICE O/P EST LOW 20 MIN: CPT

## 2021-06-21 PROCEDURE — ? ADDITIONAL NOTES

## 2021-06-21 NOTE — PROCEDURE: ADDITIONAL NOTES
Detail Level: Simple
Additional Notes: Patient consent was obtained to proceed with the visit and recommended plan of care after discussion of all risks and benefits, including the risks of COVID-19 exposure.
Render Risk Assessment In Note?: yes
Render Risk Assessment In Note?: no
Additional Notes: Patient to keep follow up with PCP and rheumatologist as indicated. Strict sun avoidance is stressed
Detail Level: Zone

## 2022-06-07 NOTE — PROCEDURE: MEDICATION COUNSELING
Humira Pregnancy And Lactation Text: This medication is Pregnancy Category B and is considered safe during pregnancy. It is unknown if this medication is excreted in breast milk. Quality 110: Preventive Care And Screening: Influenza Immunization: Influenza Immunization previously received during influenza season Detail Level: Detailed Quality 431: Preventive Care And Screening: Unhealthy Alcohol Use - Screening: Patient not identified as an unhealthy alcohol user when screened for unhealthy alcohol use using a systematic screening method Quality 226: Preventive Care And Screening: Tobacco Use: Screening And Cessation Intervention: Tobacco Screening not Performed for Medical Reasons

## 2022-07-18 ENCOUNTER — APPOINTMENT (RX ONLY)
Dept: URBAN - METROPOLITAN AREA CLINIC 77 | Facility: CLINIC | Age: 57
Setting detail: DERMATOLOGY
End: 2022-07-18

## 2022-07-18 DIAGNOSIS — L93.2 OTHER LOCAL LUPUS ERYTHEMATOSUS: ICD-10-CM | Status: STABLE

## 2022-07-18 PROBLEM — L30.9 DERMATITIS, UNSPECIFIED: Status: ACTIVE | Noted: 2022-07-18

## 2022-07-18 PROCEDURE — ? COUNSELING

## 2022-07-18 PROCEDURE — 99213 OFFICE O/P EST LOW 20 MIN: CPT

## 2022-07-18 PROCEDURE — ? TREATMENT REGIMEN

## 2022-07-18 PROCEDURE — ? ADDITIONAL NOTES

## 2022-07-18 ASSESSMENT — LOCATION SIMPLE DESCRIPTION DERM
LOCATION SIMPLE: LEFT CHEEK
LOCATION SIMPLE: CHEST
LOCATION SIMPLE: LEFT UPPER ARM
LOCATION SIMPLE: RIGHT UPPER ARM
LOCATION SIMPLE: RIGHT CHEEK

## 2022-07-18 ASSESSMENT — LOCATION DETAILED DESCRIPTION DERM
LOCATION DETAILED: RIGHT CENTRAL MALAR CHEEK
LOCATION DETAILED: LEFT ANTERIOR DISTAL UPPER ARM
LOCATION DETAILED: STERNAL NOTCH
LOCATION DETAILED: LEFT CENTRAL MALAR CHEEK
LOCATION DETAILED: RIGHT ANTERIOR PROXIMAL UPPER ARM

## 2022-07-18 ASSESSMENT — LOCATION ZONE DERM
LOCATION ZONE: ARM
LOCATION ZONE: FACE
LOCATION ZONE: TRUNK

## 2022-07-18 NOTE — PROCEDURE: ADDITIONAL NOTES
Detail Level: Simple
Additional Notes: Patient consent was obtained to proceed with the visit and recommended plan of care after discussion of all risks and benefits, including the risks of COVID-19 exposure.
Render Risk Assessment In Note?: yes
Render Risk Assessment In Note?: no
Additional Notes: Patient to keep follow up with PCP and a rheumatologist was recommended. Strict sun avoidance is stressed
Detail Level: Zone

## 2022-12-01 NOTE — PROCEDURE: MEDICATION COUNSELING
Monitor Summary:   Rhythm: paced  Rate: 79-92  Measurement: //  Ectopy: none                             Doxycycline Counseling:  Patient counseled regarding possible photosensitivity and increased risk for sunburn.  Patient instructed to avoid sunlight, if possible.  When exposed to sunlight, patients should wear protective clothing, sunglasses, and sunscreen.  The patient was instructed to call the office immediately if the following severe adverse effects occur:  hearing changes, easy bruising/bleeding, severe headache, or vision changes.  The patient verbalized understanding of the proper use and possible adverse effects of doxycycline.  All of the patient's questions and concerns were addressed.

## 2023-01-12 ENCOUNTER — TELEPHONE (OUTPATIENT)
Facility: CLINIC | Age: 58
End: 2023-01-12

## 2023-01-12 NOTE — TELEPHONE ENCOUNTER
Patient outreach message received:    Patient outreach recall entered for 3 years for EGD    Recall reminder letter mailed out to patient.

## 2023-08-16 ENCOUNTER — APPOINTMENT (RX ONLY)
Dept: URBAN - METROPOLITAN AREA CLINIC 77 | Facility: CLINIC | Age: 58
Setting detail: DERMATOLOGY
End: 2023-08-16

## 2023-08-16 DIAGNOSIS — L81.4 OTHER MELANIN HYPERPIGMENTATION: ICD-10-CM

## 2023-08-16 DIAGNOSIS — D22 MELANOCYTIC NEVI: ICD-10-CM | Status: INADEQUATELY CONTROLLED

## 2023-08-16 DIAGNOSIS — L93.0 DISCOID LUPUS ERYTHEMATOSUS: ICD-10-CM | Status: STABLE

## 2023-08-16 PROBLEM — D22.61 MELANOCYTIC NEVI OF RIGHT UPPER LIMB, INCLUDING SHOULDER: Status: ACTIVE | Noted: 2023-08-16

## 2023-08-16 PROBLEM — D22.5 MELANOCYTIC NEVI OF TRUNK: Status: ACTIVE | Noted: 2023-08-16

## 2023-08-16 PROBLEM — D22.62 MELANOCYTIC NEVI OF LEFT UPPER LIMB, INCLUDING SHOULDER: Status: ACTIVE | Noted: 2023-08-16

## 2023-08-16 PROCEDURE — ? COUNSELING

## 2023-08-16 PROCEDURE — ? SUNSCREEN RECOMMENDATIONS

## 2023-08-16 PROCEDURE — 99213 OFFICE O/P EST LOW 20 MIN: CPT

## 2023-08-16 PROCEDURE — ? PRESCRIPTION

## 2023-08-16 PROCEDURE — ? PHOTO-DOCUMENTATION

## 2023-08-16 RX ORDER — BETAMETHASONE DIPROPIONATE 0.5 MG/G
CREAM TOPICAL
Qty: 45 | Refills: 1 | Status: ERX | COMMUNITY
Start: 2023-08-16

## 2023-08-16 RX ORDER — HYDROCORTISONE 25 MG/G
CREAM TOPICAL
Qty: 30 | Refills: 1 | Status: ERX | COMMUNITY
Start: 2023-08-16

## 2023-08-16 RX ADMIN — HYDROCORTISONE: 25 CREAM TOPICAL at 00:00

## 2023-08-16 RX ADMIN — BETAMETHASONE DIPROPIONATE: 0.5 CREAM TOPICAL at 00:00

## 2023-08-16 ASSESSMENT — LOCATION SIMPLE DESCRIPTION DERM
LOCATION SIMPLE: LEFT UPPER BACK
LOCATION SIMPLE: LEFT FOREARM
LOCATION SIMPLE: LEFT CHEEK
LOCATION SIMPLE: ABDOMEN
LOCATION SIMPLE: LEFT POSTERIOR UPPER ARM
LOCATION SIMPLE: RIGHT POSTERIOR UPPER ARM
LOCATION SIMPLE: UPPER BACK

## 2023-08-16 ASSESSMENT — LOCATION DETAILED DESCRIPTION DERM
LOCATION DETAILED: LEFT PROXIMAL DORSAL FOREARM
LOCATION DETAILED: SUPERIOR THORACIC SPINE
LOCATION DETAILED: LEFT CENTRAL MALAR CHEEK
LOCATION DETAILED: LEFT PROXIMAL POSTERIOR UPPER ARM
LOCATION DETAILED: LEFT MEDIAL UPPER BACK
LOCATION DETAILED: RIGHT PROXIMAL POSTERIOR UPPER ARM
LOCATION DETAILED: LEFT LATERAL ABDOMEN

## 2023-08-16 ASSESSMENT — LOCATION ZONE DERM
LOCATION ZONE: ARM
LOCATION ZONE: TRUNK
LOCATION ZONE: FACE

## 2023-09-11 ENCOUNTER — RX ONLY (OUTPATIENT)
Age: 58
Setting detail: RX ONLY
End: 2023-09-11

## 2023-09-11 RX ORDER — HYDROCORTISONE 25 MG/G
CREAM TOPICAL
Qty: 30 | Refills: 1 | Status: ERX

## 2023-09-11 RX ORDER — BETAMETHASONE DIPROPIONATE 0.5 MG/G
CREAM TOPICAL
Qty: 45 | Refills: 1 | Status: ERX

## 2023-09-14 ENCOUNTER — RX ONLY (OUTPATIENT)
Age: 58
Setting detail: RX ONLY
End: 2023-09-14

## 2023-09-14 RX ORDER — BETAMETHASONE VALERATE 1 MG/G
CREAM TOPICAL
Qty: 45 | Refills: 1 | Status: ERX

## (undated) DEVICE — SNARE OPTMZ PLPCTM TRP

## (undated) DEVICE — NON-ADHERENT PAD PREPACK: Brand: TELFA

## (undated) DEVICE — DRAIN SILICONE RND 1/8

## (undated) DEVICE — ABSORBABLE HEMOSTAT (OXIDIZED REGENERATED CELLULOSE, U.S.P.): Brand: SURGICEL

## (undated) DEVICE — ELECTRODE 8227410 PAIRED 2 CH SET ROHS

## (undated) DEVICE — DRAIN RELIAVAC 100CC

## (undated) DEVICE — Device: Brand: DEFENDO AIR/WATER/SUCTION AND BIOPSY VALVE

## (undated) DEVICE — SUTURE ETHILON 2-0 FS

## (undated) DEVICE — 35 ML SYRINGE REGULAR TIP: Brand: MONOJECT

## (undated) DEVICE — FORCEP RADIAL JAW 4

## (undated) DEVICE — LINE MNTR ADLT SET O2 INTMD

## (undated) DEVICE — PROBE 8225101 5PK STD PRASS FL TIP ROHS

## (undated) DEVICE — ENDOSCOPY PACK UPPER: Brand: MEDLINE INDUSTRIES, INC.

## (undated) DEVICE — 3M™ TEGADERM™ TRANSPARENT FILM DRESSING, 1626W, 4 IN X 4-3/4 IN (10 CM X 12 CM), 50 EACH/CARTON, 4 CARTON/CASE: Brand: 3M™ TEGADERM™

## (undated) DEVICE — 3M™ STERI-STRIP™ REINFORCED ADHESIVE SKIN CLOSURES, R1541, 1/4 IN X 3 IN (6 MM X 75 MM), 3 STRIPS/ENVELOPE: Brand: 3M™ STERI-STRIP™

## (undated) DEVICE — 1010 S-DRAPE TOWEL DRAPE 10/BX: Brand: STERI-DRAPE™

## (undated) DEVICE — SUTURE VICRYL 3-0 SH

## (undated) DEVICE — PREMIUM WET SKIN PREP TRAY: Brand: MEDLINE INDUSTRIES, INC.

## (undated) DEVICE — HEAD AND NECK CDS-LF: Brand: MEDLINE INDUSTRIES, INC.

## (undated) DEVICE — HARMONIC FOCUS SHEARS 9CM LENGTH + ADAPTIVE TISSUE TECHNOLOGY FOR USE WITH BLUE HAND PIECE ONLY: Brand: HARMONIC FOCUS

## (undated) DEVICE — SOL  .9 1000ML BTL

## (undated) DEVICE — BIPOLAR FORCEPS CORD,BANANA LEADS: Brand: VALLEYLAB

## (undated) DEVICE — RETRACT LONE STAR STAYS DULL

## (undated) DEVICE — OINTMENT BACITRACIN PKT

## (undated) DEVICE — CLIP LGT 11MM OPEN 2.8MM 235CM

## (undated) DEVICE — SPONGE RAYTEC 4X4 RF DETECT

## (undated) DEVICE — GLOVE BIOGEL M SURG SZ 8

## (undated) DEVICE — TOWEL: OR BLU 80/CS: Brand: MEDICAL ACTION INDUSTRIES

## (undated) DEVICE — KENDALL SCD EXPRESS SLEEVES, KNEE LENGTH, MEDIUM: Brand: KENDALL SCD

## (undated) DEVICE — SUTURE PROLENE 5-0 P-3

## (undated) DEVICE — 3M(TM) TEGADERM(TM) TRANSPARENT FILM DRESSING FRAME STYLE 9505W: Brand: 3M™ TEGADERM™

## (undated) DEVICE — Device: Brand: CUSTOM PROCEDURE KIT

## (undated) DEVICE — CAUTERY BLADE 2IN INS E1455

## (undated) DEVICE — GLOVE SURG SENSICARE SZ 7-1/2

## (undated) DEVICE — HEXAGONAL CAPTIVATOR STIFF 13M

## (undated) DEVICE — DRAIN SILICONE FLAT 7MM

## (undated) DEVICE — CONMED SCOPE SAVER BITE BLOCK, 20X27 MM: Brand: SCOPE SAVER

## (undated) DEVICE — UNDYED BRAIDED (POLYGLACTIN 910), SYNTHETIC ABSORBABLE SUTURE: Brand: COATED VICRYL

## (undated) DEVICE — SPONGE: SPECIALTY PEANUT XR 100/CS: Brand: MEDICAL ACTION INDUSTRIES

## (undated) DEVICE — SUTURE PROLENE 4-0 FS-2

## (undated) DEVICE — MEDI-VAC NON-CONDUCTIVE SUCTION TUBING 6MM X 1.8M (6FT.) L: Brand: CARDINAL HEALTH

## (undated) NOTE — LETTER
[address]  [address], [address] [facility]  [phone]        Dear JULISSA Madison,      I had the pleasure of seeing your patient, Zonia Pollack on 9/18/2019. Below please find a summary of our visit.   If y are symmetric without Babinski signs. Joint position sense vibration normal.  She has antalgic gait. She illustrates chronic pain. There is bilateral, right greater than left slight postural tremor. No rest tremor.   Tone in the arms and legs are normal

## (undated) NOTE — LETTER
6/26/2018              Heather Pagan        [address] [place], [place] [id number]        To whom it may concern:     Ms. Avelina Jaimes is under my gastrointestinal care and has chronic symptoms of GERD which is complicated by esophagitis noted on upper

## (undated) NOTE — ED AVS SNAPSHOT
[facility] Emergency Department    [place]  [address] [address]    Phone:  [phone]    Fax:  [phone]           Madhavi Cash   MRN: [id number]    Department:  [facility] Emergency Department   Date of Visit:  2/2/2017 - promethazine-codeine 6.25-10 MG/5ML Syrp            Discharge References/Attachments     VIRAL SYNDROME (ADULT) (ENGLISH)      Disclosure     Insurance plans vary and the physician(s) referred by the ER may not be covered by your plan.  Please contact you If you have been prescribed any medication(s), please fill your prescription right away and begin taking the medication(s) as directed    If the emergency physician has read X-rays, these will be re-interpreted by a radiologist.  If there is a significant can help with your Affordable Care Act coverage, as well as all types of Medicaid plans. To get signed up and covered, please call [phone] and ask to get set up for an insurance coverage that is in-network with [facility]

## (undated) NOTE — LETTER
7/17/2018              Heather Pagan        [address] [place] [id number]          To whom it may concern:     Ms. Patricia Zheng is under my gastrointestinal care and has chronic symptoms of GERD which is complicated by esophagitis and March's

## (undated) NOTE — LETTER
October 3, 2019    Zainab Arthur MD  [address]     Patient: Tanya Carter   YOB: 1965   Date of Visit: 10/3/2019       Dear Dr. Kathy Carmona MD:    Thank you for referring Tanya Carter to me for evaluation • No Known Problems Maternal Cousin Female    • No Known Problems Maternal Cousin Male    • No Known Problems Paternal Cousin Female    • No Known Problems Paternal Cousin Male    • No Known Problems Other    • Glaucoma Father    • Diabetes Father    • Deborah Last edited by Sukhwinder Pandya on 10/3/2019 10:32 AM. (History)          PHYSICAL EXAM:     Base Eye Exam     Visual Acuity (Snellen - Linear)       Right Left    Dist cc 20/25 +1 20/20 -2    Near cc 20/20 20/20    Correction:  Glasses          Tonometry (Ic No evidence of plaquenil toxicity in either eye. Will follow in 1 year for a plaquenil eye exam based on current guidelines.    Patient is approved to continue on plaquenil as directed by their PCP or rheumatologist.  Reassured patient that other eyes loo

## (undated) NOTE — LETTER
April 20, 2017    Patient: Ian Gomez   Date of Visit: 4/20/2017       To Whom It May Concern:    Ian Gomez was seen and treated in our emergency department on 4/20/2017. She should not return to work until For 2 days.     If you have any questions or

## (undated) NOTE — ED AVS SNAPSHOT
[facility] Emergency Department    [place]  [address] [address]    Phone:  [phone]    Fax:  [phone]           Wendy Smith   MRN: [id number]    Department:  [facility] Emergency Department   Date of Visit:  2/2/2017 IF THERE IS ANY CHANGE OR WORSENING OF YOUR CONDITION, CALL YOUR PRIMARY CARE PHYSICIAN AT ONCE OR RETURN IMMEDIATELY TO THE EMERGENCY DEPARTMENT.     If you have been prescribed any medication(s), please fill your prescription right away and begin taking t

## (undated) NOTE — LETTER
August 9, 2017         Perla Curry NP  [address] [address]      Patient: Lynnette Capps   YOB: 1965   Date of Visit: 7/24/2017     Dear Dodie Caldera:    I saw Lynnette Capps in follow-up this afternoon in my rheumat

## (undated) NOTE — ED AVS SNAPSHOT
[facility] Emergency Department    [facility]  [address] [address]    Phone:  [phone]    Fax:  [phone]           Tanya Carter   MRN: [id number]    Department:  [facility] Emergency Department   Date of Visit:  4/20/2017 IF THERE IS ANY CHANGE OR WORSENING OF YOUR CONDITION, CALL YOUR PRIMARY CARE PHYSICIAN AT ONCE OR RETURN IMMEDIATELY TO THE EMERGENCY DEPARTMENT.     If you have been prescribed any medication(s), please fill your prescription right away and begin taking t

## (undated) NOTE — LETTER
[address] [facility], [facility]    Consent for Coronary CT Angiography    Your doctor has recommended that you [address] have a Coronary CT Angiography procedure.  Coronary CT Angiography is a diagnostic procedure using computed tomography t patient when taking medication. Allergic reactions to medication can range from very minor to very serious leading to a life threatening situation or even death. Please be sure to communicate any allergy you may have to your caregiver immediately.     The i

## (undated) NOTE — LETTER
4/1/2019    Benito Erazo        [address] [place] [id number]            Dear Benito Erazo,      Our records indicate that you are due for an appointment for a EGD or Upper Endoscopy in June 2019, or shortly there after, with Stephanie Ames

## (undated) NOTE — LETTER
1/12/2023    [address] [address]            Dear Ksenia Moralez,      Our records indicate that you are due for an appointment for an EGD or Upper Endoscopy with Dorien Mcburney, MD. Our doctors are booking out about 3-5 months in advance for procedures. Please call our office to schedule a phone screening appointment to plan for the procedure(s). Your medical well-being is important to us. If your insurance requires a referral, please call your primary care office to request one.       Thank you,      The Physicians and Staff at [facility]

## (undated) NOTE — LETTER
Last Revised 02/07/06  Obstructive Sleep Apnea Questionnaire    Clinical signs and symptoms suggesting the possibility of JACK    1. Predisposing physical characteristics (positive with any of the following present)  ? BMI 35kg/m²  ?  Craniofacial abnormalit pauses which are frightening to the observer, patient regularly falls asleep within minutes after being left unstimulated) in which case they should be treated as though they have severe sleep apnea.     The sleep laboratory’s assessment (none, mild, modera C. Requirement for postoperative opioids.                Opioid requirement             Points   None 0    Low dose oral opiod 1    High dose oral opioids, parenteral or neuraxial opiods 3      Point Total for C        Estimation of Perioperative Ris

## (undated) NOTE — LETTER
[facility], [address] [place] [address], [place]  [address]  [facility] [place] [id number] [phone]                03/14/20      [place] [facility] [address]        Dear Jessie Oliva,    I reviewed the pathology report

## (undated) NOTE — LETTER
August 9, 2017         Pedro Mosquera NP  [address] [place] [address]      Patient: Cal De La Rosa   YOB: 1965   Date of Visit: 7/8/2017     Dear Pedro Mosquera NP:    I saw your patient Cal De La Rosa in consultation She had an ovarian cyst removed. She has had asthma. She has scoliosis. She has a lot of migraine and tension headaches. She has had hypoglycemia and adds a lot of sugar to her coffee.   She is on naproxen 500 mg twice a day which decreases some of her extremity edema. There is diffuse myofascial discomfort to palpation from her neck into her shoulders, across her upper and lower back, over her lateral hips, elbow lateral epicondyles, etc.  Deltoid and iliopsoas strength is good. Neck moves well.   Radha Blackwell